# Patient Record
Sex: FEMALE | Race: WHITE | URBAN - METROPOLITAN AREA
[De-identification: names, ages, dates, MRNs, and addresses within clinical notes are randomized per-mention and may not be internally consistent; named-entity substitution may affect disease eponyms.]

---

## 2017-02-20 ENCOUNTER — TELEPHONE (OUTPATIENT)
Dept: FAMILY MEDICINE | Facility: OTHER | Age: 52
End: 2017-02-20

## 2017-02-20 ENCOUNTER — TRANSFERRED RECORDS (OUTPATIENT)
Dept: HEALTH INFORMATION MANAGEMENT | Facility: CLINIC | Age: 52
End: 2017-02-20

## 2017-02-20 NOTE — TELEPHONE ENCOUNTER
Reason for call:  Symptom  Reason for call:  Patient reporting a symptom    Symptom or request: Hives    Duration (how long have symptoms been present): on and off for over a month    Have you been treated for this before? No    Additional comments: Pt is calling because she has been having hives on and off and she has been taking over the counter medication to help with the itching. She isn't sure what is causing this. She is going thru menopause. Please advise. Pt is hoping to be worked in at the end of the day today. Pt would like to be seen at [place] or [place].     Phone Number patient can be reached at:  Home number on file [phone] (home)    Best Time:  anytime    Can we leave a detailed message on this number:  YES    Call taken on 2/20/2017 at 11:17 AM by Johanny Ibrahim

## 2017-02-20 NOTE — TELEPHONE ENCOUNTER
"Moira MCLEOD is a 51 year old female who calls with itching.    NURSING ASSESSMENT:  Description: Assessment initially starts with gathering information about her \"hives\". She describes that her skin itches and when she itches it, a red \"welt\" or \"line\" develops. This has been going on since the end of January. The location varies and will present itself on her arms, legs, neck, and back but she doesn't know much more than that. She answered \"I don't know\" to almost all of my questions. She does not know if she is jaundiced, if she has changed anything in her diet or soaps, etc. She thinks there is some mild swelling when she itches. She does admit to some difficulty breathing, but doesn't know when it occurs. She then admits to having difficulty breathing now and it feels like there is something sitting on her chest. Her father and grandfather had MI's but she doesn't know how old they are. The pressure started today.  Again, she answered \"I don't know\" to almost all further questions regarding the pressure and difficulty breathing. She did not sound like she had difficulty breathing, but did sound sluggish and tired.   Allergies:   Allergies   Allergen Reactions     Sulfa Drugs      Amoxicillin Rash     Augmentin Rash       RECOMMENDED DISPOSITION:  Call 911 - due to new onset of chest pressure and reported difficulty breathing.  Will comply with recommendation: Unknown - patient did not sound as if she was going to go. I spent numerous minutes advising recommendations.  If further questions/concerns or if Sx do not improve, worsen or new Sx develop, call your PCP or [facility] as soon as possible.    NOTES:  Disposition was determined by the first positive assessment question, therefore all previous assessment questions were negative.     Guideline used:  Telephone Triage Protocols for Nurses, Fourth Edition, Dominga Byers  Itching  Chest Pain    Breonna Holland, RN, BSN      "

## 2017-02-20 NOTE — PROGRESS NOTES
"  SUBJECTIVE:                                                    Moira Boykin is a 51 year old female who presents to clinic today for the following health issues:    ED/UC Followup:    Facility: [facility]  Date of visit: 2/20/17  Reason for visit: Hives since end of January  Current Status: Feeling fatigued after having IV steroids       She notes she has been getting itchy hives frequently, and attributes this to hormone changes. She describes the hives as \"welt-looking\" in appearance, that present on her arms, legs, feet and abdomen. The hives are mostly concentrated on right upper arm. She adds that her stress level is high. She relates that her hives are improving after IV steroids in the hospital on 2/20/17. Patient reports the first onset in August of the hives was around a time where she got , moved and started a new job. She is a . She relates she experiences increased anxiety with health issues. She sees a psychiatrist weekly. She is prescribed amitriptyline, and takes approximately every other day. Patient states that she has taken zoloft in the past, but did not like this medication. Normally, she sleeps 8-9 hours per night. Patient denies alcohol use. Patient reports things are going well with her .     Patient reports her arm is sore from where the IV was placed.    Patient relates lmp was in July. She inquires about HRT. She notes that she believes she experienced hot flashes last year.     Problem list and histories reviewed & adjusted, as indicated.  Additional history: as documented    Patient Active Problem List   Diagnosis     Other genital herpes     Anxiety state     CARDIOVASCULAR SCREENING; LDL GOAL LESS THAN 160     Concussion without loss of consciousness     Papanicolaou smear of cervix with low grade squamous intraepithelial lesion (LGSIL)     Chronic fatigue     BPPV (benign paroxysmal positional vertigo), right     Vertigo     Past Surgical " History   Procedure Laterality Date     C anesth, section       times 2       Social History   Substance Use Topics     Smoking status: Light Tobacco Smoker     Packs/day: 0.20     Types: Cigarettes     Smokeless tobacco: Never Used     Alcohol use No     Family History   Problem Relation Age of Onset     Hypertension Mother      GASTROINTESTINAL DISEASE Mother      liver and gallbladder swelling     Alcohol/Drug Mother      Depression Father      Cardiovascular Maternal Grandfather      congestive heart failure     HEART DISEASE Maternal Grandfather      congestive heart failure     Breast Cancer No family hx of      Anesthesia Reaction No family hx of      OSTEOPOROSIS No family hx of      Genetic Disorder No family hx of      Thyroid Disease No family hx of      Obesity No family hx of      Asthma No family hx of      Substance Abuse No family hx of      Colon Cancer No family hx of      Prostate Cancer No family hx of          Current Outpatient Prescriptions   Medication Sig Dispense Refill     DiphenhydrAMINE HCl (BENADRYL PO) Take 25 mg by mouth       acyclovir (ZOVIRAX) 5 % cream Apply topically 5 times daily 5 g 3     amitriptyline (ELAVIL) 25 MG tablet Take 1-2 tablets (25-50 mg) by mouth nightly as needed for sleep 60 tablet 5     meclizine (ANTIVERT) 25 MG tablet Take 25 mg by mouth Reported on 2017       ALPRAZolam (XANAX) 0.5 MG tablet Reported on 2017       fluticasone (FLONASE) 50 MCG/ACT nasal spray Reported on 2017  0     cefUROXime (CEFTIN) 500 MG tablet Take 1 tablet (500 mg) by mouth 2 times daily (Patient not taking: Reported on 2017) 20 tablet 0     meclizine (ANTIVERT) 25 MG tablet Take 0.5-1 tablets (12.5-25 mg) by mouth every 6 hours as needed for dizziness (Patient not taking: Reported on 2017) 20 tablet 0     cholecalciferol (VITAMIN D3) 50200 UNITS capsule Take 1 capsule (50,000 Units) by mouth once a week (Patient not taking: Reported on 2017) 8  "capsule 0     Problem list, Medication list, Allergies, and Medical/Social/Surgical histories reviewed in [facility] and updated as appropriate.    ROS:  Constitutional, neuro, ENT, endocrine, pulmonary, cardiac, gastrointestinal, genitourinary, musculoskeletal, integument and psychiatric systems are negative, except as otherwise noted.    This document serves as a record of the services and decisions personally performed and made by Tiff Rios DNP. It was created on her behalf by Aniyah Fajardo, a trained medical scribe. The creation of this document is based on the provider's statements to the medical scribe.  Aniyah Fajardo 2:19 PM February 23, 2017    OBJECTIVE:                                                    /80  Pulse 80  Temp 99.3  F (37.4  C) (Temporal)  Resp 10  Ht 5' 6.14\" (1.68 m)  Wt 146 lb (66.2 kg)  BMI 23.46 kg/m2  Body mass index is 23.46 kg/(m^2).  GENERAL APPEARANCE: healthy, alert and no distress  HENT: ear canals and TM's normal and nose and mouth without ulcers or lesions  NECK: no adenopathy, no asymmetry, masses, or scars and thyroid normal to palpation  RESP: lungs clear to auscultation - no rales, rhonchi or wheezes  CV: regular rates and rhythm, normal S1 S2, no S3 or S4 and no murmur, click or rub  SKIN:  No specific urticarial wheals today, has mild excoriating reaction with redness on right upper arm when scratched. No other suspicious lesions or rashes  NEURO: Normal strength and tone, mentation intact and speech normal  PSYCH: mentation appears normal and anxious affect     Diagnostic test results:  No results found for this or any previous visit (from the past 24 hour(s)).     ASSESSMENT/PLAN:                                                        ICD-10-CM    1. Urticaria L50.9 ALLERGY/ASTHMA ADULT REFERRAL   2. Symptomatic menopausal or female climacteric states N95.1    3. Anxiety state F41.1        Discussed hives. Suspect it is related to stress. Recommended patient " continue with counseling, ensure full nights sleep, exercise and healthy nutrition. Take benadryl in the morning and amitriptyline at night. If hives do not improve, referral placed to allergist.     Female climacteric symptoms are mild, not recommending HRT at this point.     HCM reviewed. Patient is due for pap smear and colonoscopy. Discussed doing FIT test instead of colonoscopy.      Follow up with Provider - Physical      The information in this document, created by the medical scribe for me, accurately reflects the services I personally performed and the decisions made by me. I have reviewed and approved this document for accuracy prior to leaving the patient care area.  February 23, 2017 2:19 PM    RAMON Riley [facility]

## 2017-02-23 ENCOUNTER — OFFICE VISIT (OUTPATIENT)
Dept: FAMILY MEDICINE | Facility: CLINIC | Age: 52
End: 2017-02-23
Payer: COMMERCIAL

## 2017-02-23 VITALS
TEMPERATURE: 99.3 F | BODY MASS INDEX: 23.46 KG/M2 | SYSTOLIC BLOOD PRESSURE: 108 MMHG | RESPIRATION RATE: 10 BRPM | DIASTOLIC BLOOD PRESSURE: 80 MMHG | HEIGHT: 66 IN | HEART RATE: 80 BPM | WEIGHT: 146 LBS

## 2017-02-23 DIAGNOSIS — N95.1 SYMPTOMATIC MENOPAUSAL OR FEMALE CLIMACTERIC STATES: ICD-10-CM

## 2017-02-23 DIAGNOSIS — F41.1 ANXIETY STATE: ICD-10-CM

## 2017-02-23 DIAGNOSIS — L50.9 URTICARIA: Primary | ICD-10-CM

## 2017-02-23 PROCEDURE — 99214 OFFICE O/P EST MOD 30 MIN: CPT | Performed by: NURSE PRACTITIONER

## 2017-02-23 ASSESSMENT — PAIN SCALES - GENERAL: PAINLEVEL: NO PAIN (0)

## 2017-02-23 NOTE — NURSING NOTE
"Chief Complaint   Patient presents with     ER F/U     Hives     Panel Management     MyChart, Tdap, Flu Shot, Pap, Colonoscopy/FIT, Tobacco Cessation, Lipids       Initial /80  Pulse 80  Temp 99.3  F (37.4  C) (Temporal)  Resp 10  Ht 5' 6.14\" (1.68 m)  Wt 146 lb (66.2 kg)  BMI 23.46 kg/m2 Estimated body mass index is 23.46 kg/(m^2) as calculated from the following:    Height as of this encounter: 5' 6.14\" (1.68 m).    Weight as of this encounter: 146 lb (66.2 kg).  Medication Reconciliation: complete     Jayna Dominguez CMA  "

## 2017-02-23 NOTE — MR AVS SNAPSHOT
After Visit Summary   2/23/2017    Moira Boykin    MRN: [id number]           Patient Information     Date Of Birth          1965        Visit Information        Provider Department      2/23/2017 2:00 PM Tiff Rios APRN CNP [facility]        Today's Diagnoses     Urticaria    -  1       Follow-ups after your visit        Additional Services     ALLERGY/ASTHMA ADULT REFERRAL       Your provider has referred you to: [facility]: [facility] [phone] [email or web address]    Please be aware that coverage of these services is subject to the terms and limitations of your health insurance plan.  Call member services at your health plan with any benefit or coverage questions.      Please bring the following with you to your appointment:    (1) Any X-Rays, CTs or MRIs which have been performed.  Contact the facility where they were done to arrange for  prior to your scheduled appointment.    (2) List of current medications  (3) This referral request   (4) Any documents/labs given to you for this referral                  Who to contact     If you have questions or need follow up information about today's clinic visit or your schedule please contact [facility] directly at [phone].  Normal or non-critical lab and imaging results will be communicated to you by MyChart, letter or phone within 4 business days after the clinic has received the results. If you do not hear from us within 7 days, please contact the clinic through MyChart or phone. If you have a critical or abnormal lab result, we will notify you by phone as soon as possible.  Submit refill requests through [employer] or call your pharmacy and they will forward the refill request to us. Please allow 3 business days for your refill to be completed.          Additional Information About Your Visit        [employer] Information     [employer] lets you send messages to  "your doctor, view your test results, renew your prescriptions, schedule appointments and more. To sign up, go to [email or web address] . Click on \"Log in\" on the left side of the screen, which will take you to the Welcome page. Then click on \"Sign up Now\" on the right side of the page.     You will be asked to enter the access code listed below, as well as some personal information. Please follow the directions to create your username and password.     Your access code is: [email or web address]  Expires: 2017  2:48 PM     Your access code will  in 90 days. If you need help or a new code, please call your [facility] clinic or [phone].        Care EveryWhere ID     This is your Care EveryWhere ID. This could be used by other organizations to access your [facility] medical records  [id number]        Your Vitals Were     Pulse Temperature Respirations Height BMI (Body Mass Index)       80 99.3  F (37.4  C) (Temporal) 10 5' 6.14\" (1.68 m) 23.46 kg/m2        Blood Pressure from Last 3 Encounters:   17 108/80   16 118/72   16 106/60    Weight from Last 3 Encounters:   17 146 lb (66.2 kg)   16 165 lb (74.8 kg)   16 143 lb (64.9 kg)              We Performed the Following     ALLERGY/ASTHMA ADULT REFERRAL        Primary Care Provider Office Phone # Fax #    Mely Corona [phone]       [facility]  [address] [address]        Thank you!     Thank you for choosing [facility]  for your care. Our goal is always to provide you with excellent care. Hearing back from our patients is one way we can continue to improve our services. Please take a few minutes to complete the written survey that you may receive in the mail after your visit with us. Thank you!             Your Updated Medication List - Protect others around you: Learn how to safely use, store and throw away your medicines at www.disposemymeds.org.          This " list is accurate as of: 2/23/17  2:51 PM.  Always use your most recent med list.                   Brand Name Dispense Instructions for use    acyclovir 5 % cream    ZOVIRAX    5 g    Apply topically 5 times daily       amitriptyline 25 MG tablet    ELAVIL    60 tablet    Take 1-2 tablets (25-50 mg) by mouth nightly as needed for sleep       BENADRYL PO      Take 25 mg by mouth       cefUROXime 500 MG tablet    CEFTIN    20 tablet    Take 1 tablet (500 mg) by mouth 2 times daily       cholecalciferol 99955 UNITS capsule    VITAMIN D3    8 capsule    Take 1 capsule (50,000 Units) by mouth once a week       fluticasone 50 MCG/ACT spray    FLONASE     Reported on 2/23/2017       * meclizine 25 MG tablet    ANTIVERT     Take 25 mg by mouth Reported on 2/23/2017       * meclizine 25 MG tablet    ANTIVERT    20 tablet    Take 0.5-1 tablets (12.5-25 mg) by mouth every 6 hours as needed for dizziness       XANAX 0.5 MG tablet   Generic drug:  ALPRAZolam      Reported on 2/23/2017       * Notice:  This list has 2 medication(s) that are the same as other medications prescribed for you. Read the directions carefully, and ask your doctor or other care provider to review them with you.

## 2017-03-16 ENCOUNTER — TELEPHONE (OUTPATIENT)
Dept: FAMILY MEDICINE | Facility: CLINIC | Age: 52
End: 2017-03-16

## 2017-03-16 NOTE — LETTER
[facility]  [address]  [place] [address]  [phone]      March 16, 2017      Moira Boykin  [address] [place] [id number]        Dear Moira,    We received a notice that you are to be scheduled with a specialty clinic. The referral has been placed by your provider and you can call to schedule an appointment directly.     Enclosed, you will find the referral with the phone number to call to schedule an appointment.    Please call us if you have any questions or concerns.        Sincerely,    Your [facility] Support Staff/ES

## 2017-03-16 NOTE — TELEPHONE ENCOUNTER
You placed a referral for patient to Allergy & Asthma on 2/23/17.  Patient has not scheduled as of yet.      Please review and forward to team if follow up with the patient is needed.     Thank you!  Antonia/Clinic Referrals Dyad II

## 2017-05-02 NOTE — PROGRESS NOTES
SUBJECTIVE:     CC: Moira Boykin is an 51 year old woman who presents for preventive health visit.     Healthy Habits:    Do you get at least three servings of calcium containing foods daily (dairy, green leafy vegetables, etc.)? yes    Amount of exercise or daily activities, outside of work: 3 day(s) per week. 3 miles    Problems taking medications regularly No    Medication side effects: No    Have you had an eye exam in the past two years? yes    Do you see a dentist twice per year? yes    Do you have sleep apnea, excessive snoring or daytime drowsiness?no      Hyperlipidemia Follow-Up      Rate your low fat/cholesterol diet?: not monitoring fat    Taking statin?  No    Other lipid medications/supplements?:  none       Patient takes 12.5 mg of amitriptyline and this has improved mood symptoms. She states she had a stressful weekend because she had to spend time with her family for her son's graduation. She notes she felt that she was not welcome and it was awkward. Patient states she had hives the next day after this. She notes that sees her therapist 1-2/week.     Patient had her period for 7 days in May and April. She states her flow was some what heavier than usual, but she was not saturating her tampons. She notes some minimal spotting on May 5th. She denies hot flashes.     She had a colposcopy after last abnormal pap smear.     Patient relates she has tingling in her feet in AM and PM. She does not take an iron supplement and does not eat green leafy vegetables.      Patient denies family history of colon cancer.     Today's PHQ-2 Score:   PHQ-2 ( 1999 Pf[employer]) 2/23/2017 8/4/2016   Q1: Little interest or pleasure in doing things 0 0   Q2: Feeling down, depressed or hopeless 0 0   PHQ-2 Score 0 0       Abuse: Current or Past(Physical, Sexual or Emotional)- No  Do you feel safe in your environment - Yes    Social History   Substance Use Topics     Smoking status: Light Tobacco Smoker     Packs/day:  0.20     Types: Cigarettes     Smokeless tobacco: Never Used      Comment: Just using E-Cig Now     Alcohol use No     The patient does not drink >3 drinks per day nor >7 drinks per week.    Recent Labs   Lab Test  11   1545  09   1255   CHOL  225*  203*   HDL  59  56   LDL  137*  130*   TRIG  144  86   CHOLHDLRATIO  4.0  4.0       Reviewed orders with patient.  Reviewed health maintenance and updated orders accordingly - Yes    Mammo Decision Support:  Patient over age 50, mutual decision to screen reflected in health maintenance.    Pertinent mammograms are reviewed under the imaging tab.  History of abnormal Pap smear:   Last 3 Pap Results:   PAP (no units)   Date Value   2016 LSIL (A)   2013 OTHER-NIL, See Result   2008 NIL       Reviewed and updated as needed this visit by clinical staff  Tobacco  Allergies  Med Hx  Surg Hx  Fam Hx  Soc Hx        Reviewed and updated as needed this visit by Provider        Past Medical History:   Diagnosis Date     Papanicolaou smear of cervix with low grade squamous intraepithelial lesion (LGSIL) 16    neg HR HPV      Past Surgical History:   Procedure Laterality Date     C ANESTH, SECTION      times 2       ROS:  C: NEGATIVE for fever, chills, change in weight  I: NEGATIVE for worrisome rashes, moles or lesions  E: NEGATIVE for vision changes or irritation  ENT: NEGATIVE for ear, mouth and throat problems  R: NEGATIVE for significant cough or SOB  B: NEGATIVE for masses, tenderness or discharge  CV: NEGATIVE for chest pain, palpitations or peripheral edema  GI: NEGATIVE for nausea, abdominal pain, heartburn, or change in bowel habits  : NEGATIVE for unusual urinary or vaginal symptoms. Periods are regular.  M: NEGATIVE for significant arthralgias or myalgia  N: NEGATIVE for weakness, dizziness or paresthesias  P: NEGATIVE for changes in mood or affect    Problem list, Medication list, Allergies, and Medical/Social/Surgical  histories reviewed in EPIC and updated as appropriate.  Patient Active Problem List   Diagnosis     Other genital herpes     Anxiety state     CARDIOVASCULAR SCREENING; LDL GOAL LESS THAN 160     Concussion without loss of consciousness     Papanicolaou smear of cervix with low grade squamous intraepithelial lesion (LGSIL)     Chronic fatigue     BPPV (benign paroxysmal positional vertigo), right     Vertigo     Past Surgical History:   Procedure Laterality Date     C ANESTH, SECTION      times 2       Social History   Substance Use Topics     Smoking status: Light Tobacco Smoker     Packs/day: 0.20     Types: Cigarettes     Smokeless tobacco: Never Used      Comment: Just using E-Cig Now     Alcohol use No     Family History   Problem Relation Age of Onset     Hypertension Mother      GASTROINTESTINAL DISEASE Mother      liver and gallbladder swelling     Alcohol/Drug Mother      Depression Father      Cardiovascular Maternal Grandfather      congestive heart failure     HEART DISEASE Maternal Grandfather      congestive heart failure     Breast Cancer No family hx of      Anesthesia Reaction No family hx of      OSTEOPOROSIS No family hx of      Genetic Disorder No family hx of      Thyroid Disease No family hx of      Obesity No family hx of      Asthma No family hx of      Substance Abuse No family hx of      Colon Cancer No family hx of      Prostate Cancer No family hx of          Current Outpatient Prescriptions   Medication Sig Dispense Refill     VITAMIN D, CHOLECALCIFEROL, PO Take 1,000 Units by mouth daily       amitriptyline (ELAVIL) 25 MG tablet Take 1 tablet (25 mg) by mouth nightly as needed for sleep 90 tablet 3     DiphenhydrAMINE HCl (BENADRYL PO) Take 25 mg by mouth       meclizine (ANTIVERT) 25 MG tablet Take 25 mg by mouth Reported on 2017       ALPRAZolam (XANAX) 0.5 MG tablet Reported on 2017       fluticasone (FLONASE) 50 MCG/ACT nasal spray Reported on 2017  0      "cefUROXime (CEFTIN) 500 MG tablet Take 1 tablet (500 mg) by mouth 2 times daily (Patient not taking: Reported on 5/8/2017) 20 tablet 0     meclizine (ANTIVERT) 25 MG tablet Take 0.5-1 tablets (12.5-25 mg) by mouth every 6 hours as needed for dizziness (Patient not taking: Reported on 2/23/2017) 20 tablet 0     cholecalciferol (VITAMIN D3) 31002 UNITS capsule Take 1 capsule (50,000 Units) by mouth once a week (Patient not taking: Reported on 2/23/2017) 8 capsule 0     acyclovir (ZOVIRAX) 5 % cream Apply topically 5 times daily (Patient not taking: Reported on 5/8/2017) 5 g 3     [DISCONTINUED] amitriptyline (ELAVIL) 25 MG tablet Take 1-2 tablets (25-50 mg) by mouth nightly as needed for sleep 60 tablet 5     OBJECTIVE:     /78  Pulse 68  Temp 99.6  F (37.6  C) (Temporal)  Resp 8  Ht 5' 5.16\" (1.655 m)  Wt 141 lb 14.4 oz (64.4 kg)  LMP 04/03/2017  BMI 23.5 kg/m2  EXAM:  GENERAL: healthy, alert and no distress  EYES: Eyes grossly normal to inspection, PERRL and conjunctivae and sclerae normal  HENT: ear canals and TM's normal, nose and mouth without ulcers or lesions  NECK: no adenopathy, no asymmetry, masses, or scars and thyroid normal to palpation  RESP: lungs clear to auscultation - no rales, rhonchi or wheezes  BREAST: normal without masses, tenderness or nipple discharge and no palpable axillary masses or adenopathy  CV: regular rate and rhythm, normal S1 S2, no S3 or S4, no murmur, click or rub, no peripheral edema and peripheral pulses strong  ABDOMEN: soft, nontender, no hepatosplenomegaly, no masses and bowel sounds normal   (female): normal female external genitalia, normal urethral meatus, vaginal mucosa pink, moist, well rugated, and normal cervix/adnexa/uterus without masses or discharge  MS: no gross musculoskeletal defects noted, no edema  SKIN: no suspicious lesions or rashes  NEURO: Normal strength and tone, mentation intact and speech normal  PSYCH: mentation appears normal, affect " normal/bright    ASSESSMENT/PLAN:         ICD-10-CM    1. Encounter for routine adult health examination without abnormal findings Z00.00 Lipid Profile (Chol, Trig, HDL, LDL calc)     GLUCOSE     TSH with free T4 reflex   2. Encounter for screening colonoscopy Z12.11 GASTROENTEROLOGY ADULT REF PROCEDURE ONLY   3. CARDIOVASCULAR SCREENING; LDL GOAL LESS THAN 160 Z13.6 Lipid Profile (Chol, Trig, HDL, LDL calc)   4. Encounter for screening mammogram for breast cancer Z12.31 *MA Screening Digital Bilateral   5. YONI (generalized anxiety disorder) F41.1 amitriptyline (ELAVIL) 25 MG tablet   6. Papanicolaou smear of cervix with low grade squamous intraepithelial lesion (LGSIL) R87.612 HPV High Risk Types DNA Cervical     Pap imaged thin layer diagnostic with HPV (select HPV order below)       Order placed for refill of amitryptiline. Medication direction, dosage, and side effects discussed with patient. Recommended patient take 25 mg     Pap smear with HPV collected today. Pt. declines needs for STD screen.     Recommended patient take an iron supplement to see if low iron is cause of tingling in LE, screening for DM.    Discussed colon cancer screenings - FIT screen vs colonoscopy. Order placed for colonoscopy.     Mammogram is due in November 2017.     Recommended patient take calcium with vitamin D supplement in AM and PM.     Order placed for labs that the patient will complete today.      COUNSELING:   Reviewed preventive health counseling, as reflected in patient instructions       Regular exercise       Healthy diet/nutrition       Colon cancer screening    BP Screening:   Last 3 BP Readings:    BP Readings from Last 3 Encounters:   05/08/17 130/78   02/23/17 108/80   12/08/16 118/72       The following was recommended to the patient:  Re-screen BP within a year and recommended lifestyle modifications     reports that she has been smoking Cigarettes.  She has been smoking about 0.20 packs per day. She has never used  "smokeless tobacco.  Tobacco Cessation Action Plan: Self help information given to patient- mailed.   Estimated body mass index is 23.5 kg/(m^2) as calculated from the following:    Height as of this encounter: 5' 5.16\" (1.655 m).    Weight as of this encounter: 141 lb 14.4 oz (64.4 kg).       Counseling Resources:  ATP IV Guidelines  Pooled Cohorts Equation Calculator  Breast Cancer Risk Calculator  FRAX Risk Assessment  ICSI Preventive Guidelines  Dietary Guidelines for Americans, 2010  USDA's MyPlate  ASA Prophylaxis  Lung CA Screening    The information in this document, created by the medical scribe for me, accurately reflects the services I personally performed and the decisions made by me. I have reviewed and approved this document for accuracy prior to leaving the patient care area.  May 8, 2017 11:42 AM    RAMON [facility]  "

## 2017-05-08 ENCOUNTER — TELEPHONE (OUTPATIENT)
Dept: FAMILY MEDICINE | Facility: CLINIC | Age: 52
End: 2017-05-08

## 2017-05-08 ENCOUNTER — OFFICE VISIT (OUTPATIENT)
Dept: FAMILY MEDICINE | Facility: CLINIC | Age: 52
End: 2017-05-08
Payer: COMMERCIAL

## 2017-05-08 VITALS
BODY MASS INDEX: 23.64 KG/M2 | TEMPERATURE: 99.6 F | SYSTOLIC BLOOD PRESSURE: 130 MMHG | RESPIRATION RATE: 8 BRPM | HEIGHT: 65 IN | DIASTOLIC BLOOD PRESSURE: 78 MMHG | WEIGHT: 141.9 LBS | HEART RATE: 68 BPM

## 2017-05-08 DIAGNOSIS — Z12.11 ENCOUNTER FOR SCREENING COLONOSCOPY: ICD-10-CM

## 2017-05-08 DIAGNOSIS — Z00.00 ENCOUNTER FOR ROUTINE ADULT HEALTH EXAMINATION WITHOUT ABNORMAL FINDINGS: Primary | ICD-10-CM

## 2017-05-08 DIAGNOSIS — Z13.6 CARDIOVASCULAR SCREENING; LDL GOAL LESS THAN 160: ICD-10-CM

## 2017-05-08 DIAGNOSIS — R87.612 PAPANICOLAOU SMEAR OF CERVIX WITH LOW GRADE SQUAMOUS INTRAEPITHELIAL LESION (LGSIL): ICD-10-CM

## 2017-05-08 DIAGNOSIS — F41.1 GAD (GENERALIZED ANXIETY DISORDER): ICD-10-CM

## 2017-05-08 DIAGNOSIS — Z12.31 ENCOUNTER FOR SCREENING MAMMOGRAM FOR BREAST CANCER: ICD-10-CM

## 2017-05-08 LAB
CHOLEST SERPL-MCNC: 204 MG/DL
GLUCOSE SERPL-MCNC: 86 MG/DL (ref 70–99)
HDLC SERPL-MCNC: 82 MG/DL
LDLC SERPL CALC-MCNC: 110 MG/DL
NONHDLC SERPL-MCNC: 122 MG/DL
TRIGL SERPL-MCNC: 61 MG/DL
TSH SERPL DL<=0.005 MIU/L-ACNC: 1.1 MU/L (ref 0.4–4)

## 2017-05-08 PROCEDURE — 84443 ASSAY THYROID STIM HORMONE: CPT | Performed by: NURSE PRACTITIONER

## 2017-05-08 PROCEDURE — 82947 ASSAY GLUCOSE BLOOD QUANT: CPT | Performed by: NURSE PRACTITIONER

## 2017-05-08 PROCEDURE — 99213 OFFICE O/P EST LOW 20 MIN: CPT | Mod: 25 | Performed by: NURSE PRACTITIONER

## 2017-05-08 PROCEDURE — 88141 CYTOPATH C/V INTERPRET: CPT | Performed by: NURSE PRACTITIONER

## 2017-05-08 PROCEDURE — 80061 LIPID PANEL: CPT | Performed by: NURSE PRACTITIONER

## 2017-05-08 PROCEDURE — 88175 CYTOPATH C/V AUTO FLUID REDO: CPT | Performed by: NURSE PRACTITIONER

## 2017-05-08 PROCEDURE — 99396 PREV VISIT EST AGE 40-64: CPT | Performed by: NURSE PRACTITIONER

## 2017-05-08 PROCEDURE — 87624 HPV HI-RISK TYP POOLED RSLT: CPT | Performed by: NURSE PRACTITIONER

## 2017-05-08 PROCEDURE — 36415 COLL VENOUS BLD VENIPUNCTURE: CPT | Performed by: NURSE PRACTITIONER

## 2017-05-08 ASSESSMENT — ANXIETY QUESTIONNAIRES
5. BEING SO RESTLESS THAT IT IS HARD TO SIT STILL: NOT AT ALL
3. WORRYING TOO MUCH ABOUT DIFFERENT THINGS: NOT AT ALL
IF YOU CHECKED OFF ANY PROBLEMS ON THIS QUESTIONNAIRE, HOW DIFFICULT HAVE THESE PROBLEMS MADE IT FOR YOU TO DO YOUR WORK, TAKE CARE OF THINGS AT HOME, OR GET ALONG WITH OTHER PEOPLE: SOMEWHAT DIFFICULT
GAD7 TOTAL SCORE: 3
7. FEELING AFRAID AS IF SOMETHING AWFUL MIGHT HAPPEN: NOT AT ALL
1. FEELING NERVOUS, ANXIOUS, OR ON EDGE: SEVERAL DAYS
2. NOT BEING ABLE TO STOP OR CONTROL WORRYING: SEVERAL DAYS
6. BECOMING EASILY ANNOYED OR IRRITABLE: SEVERAL DAYS

## 2017-05-08 ASSESSMENT — PATIENT HEALTH QUESTIONNAIRE - PHQ9: 5. POOR APPETITE OR OVEREATING: NOT AT ALL

## 2017-05-08 ASSESSMENT — PAIN SCALES - GENERAL: PAINLEVEL: NO PAIN (0)

## 2017-05-08 NOTE — NURSING NOTE
"Chief Complaint   Patient presents with     Physical     No questions or concerns. She will let Tiff know what she has been doing     Panel Management     Mychart, Tdap, Pap, Colon Cancer Screening, YONI. Lipid       Initial /78  Pulse 68  Temp 99.6  F (37.6  C) (Temporal)  Resp 8  Ht 5' 5.16\" (1.655 m)  Wt 141 lb 14.4 oz (64.4 kg)  LMP 04/03/2017  BMI 23.5 kg/m2 Estimated body mass index is 23.5 kg/(m^2) as calculated from the following:    Height as of this encounter: 5' 5.16\" (1.655 m).    Weight as of this encounter: 141 lb 14.4 oz (64.4 kg).  Medication Reconciliation: complete     Jayna Macias CMA  "

## 2017-05-08 NOTE — MR AVS SNAPSHOT
After Visit Summary   5/8/2017    Moira Boykin    MRN: [id number]           Patient Information     Date Of Birth          1965        Visit Information        Provider Department      5/8/2017 11:00 AM Tiff Rios APRN [facility] [place] [facility]        Today's Diagnoses     Encounter for routine adult health examination without abnormal findings    -  1    Encounter for screening colonoscopy        CARDIOVASCULAR SCREENING; LDL GOAL LESS THAN 160        Encounter for screening mammogram for breast cancer        YONI (generalized anxiety disorder)        Papanicolaou smear of cervix with low grade squamous intraepithelial lesion (LGSIL)          Care Instructions      Preventive Health Recommendations  Female Ages 50 - 64    Yearly exam: See your health care provider every year in order to  o Review health changes.   o Discuss preventive care.    o Review your medicines if your doctor has prescribed any.      Get a Pap test every three years (unless you have an abnormal result and your provider advises testing more often).    If you get Pap tests with HPV test, you only need to test every 5 years, unless you have an abnormal result.     You do not need a Pap test if your uterus was removed (hysterectomy) and you have not had cancer.    You should be tested each year for STDs (sexually transmitted diseases) if you're at risk.     Have a mammogram every 1 to 2 years.    Have a colonoscopy at age 50, or have a yearly FIT test (stool test). These exams screen for colon cancer.      Have a cholesterol test every 5 years, or more often if advised.    Have a diabetes test (fasting glucose) every three years. If you are at risk for diabetes, you should have this test more often.     If you are at risk for osteoporosis (brittle bone disease), think about having a bone density scan (DEXA).    Shots: Get a flu shot each year. Get a tetanus shot every 10 years.    Nutrition:     Eat at least 5  servings of fruits and vegetables each day.    Eat whole-grain bread, whole-wheat pasta and brown rice instead of white grains and rice.    Talk to your provider about Calcium and Vitamin D.     Lifestyle    Exercise at least 150 minutes a week (30 minutes a day, 5 days a week). This will help you control your weight and prevent disease.    Limit alcohol to one drink per day.    No smoking.     Wear sunscreen to prevent skin cancer.     See your dentist every six months for an exam and cleaning.    See your eye doctor every 1 to 2 years.          Follow-ups after your visit        Additional Services     GASTROENTEROLOGY ADULT REF PROCEDURE ONLY       Last Lab Result: Creatinine (mg/dL)       Date                     Value                 11/11/2011               0.80             ----------  Body mass index is 23.5 kg/(m^2).      Patient will be contacted to schedule procedure.     Please be aware that coverage of these services is subject to the terms and limitations of your health insurance plan.  Call member services at your health plan with any benefit or coverage questions.  Any procedures must be performed at a [place] facility OR coordinated by your clinic's referral office.    Please bring the following with you to your appointment:    (1) Any X-Rays, CTs or MRIs which have been performed.  Contact the facility where they were done to arrange for  prior to your scheduled appointment.    (2) List of current medications   (3) This referral request   (4) Any documents/labs given to you for this referral                  Future tests that were ordered for you today     Open Future Orders        Priority Expected Expires Ordered    *MA Screening Digital Bilateral Routine  5/8/2018 5/8/2017            Who to contact     If you have questions or need follow up information about today's clinic visit or your schedule please contact [facility] directly at [phone].  Normal or non-critical lab  "and imaging results will be communicated to you by MyChart, letter or phone within 4 business days after the clinic has received the results. If you do not hear from us within 7 days, please contact the clinic through [employer] or phone. If you have a critical or abnormal lab result, we will notify you by phone as soon as possible.  Submit refill requests through [employer] or call your pharmacy and they will forward the refill request to us. Please allow 3 business days for your refill to be completed.          Additional Information About Your Visit        FixstarsharNanoference Information     [employer] lets you send messages to your doctor, view your test results, renew your prescriptions, schedule appointments and more. To sign up, go to [email or web address] . Click on \"Log in\" on the left side of the screen, which will take you to the Welcome page. Then click on \"Sign up Now\" on the right side of the page.     You will be asked to enter the access code listed below, as well as some personal information. Please follow the directions to create your username and password.     Your access code is: [email or web address]  Expires: 2017  3:48 PM     Your access code will  in 90 days. If you need help or a new code, please call your [place] clinic or [phone].        Care EveryWhere ID     This is your Care EveryWhere ID. This could be used by other organizations to access your [place] medical records  [id number]        Your Vitals Were     Pulse Temperature Respirations Height Last Period BMI (Body Mass Index)    68 99.6  F (37.6  C) (Temporal) 8 5' 5.16\" (1.655 m) 2017 23.5 kg/m2       Blood Pressure from Last 3 Encounters:   17 130/78   17 108/80   16 118/72    Weight from Last 3 Encounters:   17 141 lb 14.4 oz (64.4 kg)   17 146 lb (66.2 kg)   16 165 lb (74.8 kg)              We Performed the Following     GASTROENTEROLOGY ADULT REF PROCEDURE ONLY     GLUCOSE     HPV High Risk " Types DNA Cervical     Lipid Profile (Chol, Trig, HDL, LDL calc)     OFFICE/OUTPT VISIT,EST,LEVL III     Pap imaged thin layer diagnostic with HPV (select HPV order below)     TSH with free T4 reflex          Today's Medication Changes          These changes are accurate as of: 5/8/17  3:32 PM.  If you have any questions, ask your nurse or doctor.               These medicines have changed or have updated prescriptions.        Dose/Directions    amitriptyline 25 MG tablet   Commonly known as:  ELAVIL   This may have changed:  how much to take   Used for:  YONI (generalized anxiety disorder)   Changed by:  Tiff Rios APRN CNP        Dose:  25 mg   Take 1 tablet (25 mg) by mouth nightly as needed for sleep   Quantity:  90 tablet   Refills:  3            Where to get your medicines      These medications were sent to [place] [id number] [place] [facility] - [address]  [address] [place] [id number]     Phone:  [phone]     amitriptyline 25 MG tablet                Primary Care Provider Office Phone # Fax #    Mely Corona [phone]       [address] [address]        Thank you!     Thank you for choosing [facility]  for your care. Our goal is always to provide you with excellent care. Hearing back from our patients is one way we can continue to improve our services. Please take a few minutes to complete the written survey that you may receive in the mail after your visit with us. Thank you!             Your Updated Medication List - Protect others around you: Learn how to safely use, store and throw away your medicines at www.disposemymeds.org.          This list is accurate as of: 5/8/17  3:32 PM.  Always use your most recent med list.                   Brand Name Dispense Instructions for use    acyclovir 5 % cream    ZOVIRAX    5 g    Apply topically 5 times daily       amitriptyline 25 MG tablet    ELAVIL    90 tablet     Take 1 tablet (25 mg) by mouth nightly as needed for sleep       BENADRYL PO      Take 25 mg by mouth       cefuroxime 500 MG tablet    CEFTIN    20 tablet    Take 1 tablet (500 mg) by mouth 2 times daily       * VITAMIN D (CHOLECALCIFEROL) PO      Take 1,000 Units by mouth daily       * cholecalciferol 98303 UNITS capsule    VITAMIN D3    8 capsule    Take 1 capsule (50,000 Units) by mouth once a week       fluticasone 50 MCG/ACT spray    FLONASE     Reported on 5/8/2017       * meclizine 25 MG tablet    ANTIVERT     Take 25 mg by mouth Reported on 5/8/2017       * meclizine 25 MG tablet    ANTIVERT    20 tablet    Take 0.5-1 tablets (12.5-25 mg) by mouth every 6 hours as needed for dizziness       XANAX 0.5 MG tablet   Generic drug:  ALPRAZolam      Reported on 5/8/2017       * Notice:  This list has 4 medication(s) that are the same as other medications prescribed for you. Read the directions carefully, and ask your doctor or other care provider to review them with you.

## 2017-05-08 NOTE — NURSING NOTE
Once in the room to administer pt Tdap vaccine, she declined the vaccine. Stated that she was feeling overwhelmed with her daily activities and lab work and decided this was not the best time for her to have the vaccine.    Jayna Macias CMA

## 2017-05-08 NOTE — TELEPHONE ENCOUNTER
Reason for Call:  Other- Medication     Detailed comments: Pt was seen today and told to take Vit D, Calcium and Centrum Silver. The Centrum Silver has 1000 IU of Vitamin D and 300 mg of Calcium. Should she be taking more Vit D and Calcium on top of that?     Phone Number Patient can be reached at: Home number on file [phone] (home)    Best Time: any     Can we leave a detailed message on this number? YES    Call taken on 5/8/2017 at 2:40 PM by Jody Jimenez

## 2017-05-08 NOTE — TELEPHONE ENCOUNTER
Yes, but then needs only one additional dose, not two doses of the calcium/vitamin D supplement. Tiff Rios

## 2017-05-09 ENCOUNTER — TELEPHONE (OUTPATIENT)
Dept: FAMILY MEDICINE | Facility: OTHER | Age: 52
End: 2017-05-09

## 2017-05-09 ASSESSMENT — ANXIETY QUESTIONNAIRES: GAD7 TOTAL SCORE: 3

## 2017-05-10 NOTE — TELEPHONE ENCOUNTER
Left message for patient to return call to schedule colonoscopy or EGD. If Molly or Sindi are unavailable, please transfer to the surgery center.     OK to schedule with KAYLAH or Harika

## 2017-05-11 LAB
COPATH REPORT: ABNORMAL
PAP: ABNORMAL

## 2017-05-11 NOTE — TELEPHONE ENCOUNTER
Left message for patient to return call to schedule colonoscopy or EGD. If Molly or Sindi are unavailable, please transfer to the surgery center.-Letter sent     OK to schedule with Ryan or Harika

## 2017-05-12 LAB
FINAL DIAGNOSIS: NORMAL
HPV HR 12 DNA CVX QL NAA+PROBE: NEGATIVE
HPV16 DNA SPEC QL NAA+PROBE: NEGATIVE
HPV18 DNA SPEC QL NAA+PROBE: NEGATIVE
SPECIMEN DESCRIPTION: NORMAL

## 2017-06-05 ENCOUNTER — OFFICE VISIT (OUTPATIENT)
Dept: OBGYN | Facility: OTHER | Age: 52
End: 2017-06-05
Payer: COMMERCIAL

## 2017-06-05 VITALS
HEART RATE: 86 BPM | DIASTOLIC BLOOD PRESSURE: 84 MMHG | WEIGHT: 145 LBS | BODY MASS INDEX: 24.01 KG/M2 | SYSTOLIC BLOOD PRESSURE: 126 MMHG

## 2017-06-05 DIAGNOSIS — R87.610 PAPANICOLAOU SMEAR OF CERVIX WITH ATYPICAL SQUAMOUS CELLS OF UNDETERMINED SIGNIFICANCE (ASC-US): Primary | ICD-10-CM

## 2017-06-05 PROCEDURE — 99243 OFF/OP CNSLTJ NEW/EST LOW 30: CPT | Mod: 25 | Performed by: OBSTETRICS & GYNECOLOGY

## 2017-06-05 PROCEDURE — 88305 TISSUE EXAM BY PATHOLOGIST: CPT | Performed by: OBSTETRICS & GYNECOLOGY

## 2017-06-05 PROCEDURE — 57456 ENDOCERV CURETTAGE W/SCOPE: CPT | Performed by: OBSTETRICS & GYNECOLOGY

## 2017-06-05 ASSESSMENT — PAIN SCALES - GENERAL: PAINLEVEL: NO PAIN (0)

## 2017-06-05 NOTE — NURSING NOTE
"No chief complaint on file.      Initial /84  Pulse 86  Wt 145 lb (65.8 kg)  LMP 04/03/2017  BMI 24.01 kg/m2 Estimated body mass index is 24.01 kg/(m^2) as calculated from the following:    Height as of 5/8/17: 5' 5.16\" (1.655 m).    Weight as of this encounter: 145 lb (65.8 kg).  Medication Reconciliation: complete  "

## 2017-06-05 NOTE — MR AVS SNAPSHOT
After Visit Summary   6/5/2017    Moira Boykin    MRN: [id number]           Patient Information     Date Of Birth          1965        Visit Information        Provider Department      6/5/2017 9:00 AM Paulette Amaya DO; [facility] COLP EQUIP, [facility]; NL PROC ROOM, [facility]        Today's Diagnoses     Papanicolaou smear of cervix with atypical squamous cells of undetermined significance (ASC-US)    -  1      Care Instructions    Colposcopy  Colposcopy is a procedure that gives your healthcare provider a magnified view of the cervix. It is done using a lighted microscope called a colposcope. In most cases, a sample of cervical cells is taken during a biopsy. The sample can then be studied in a lab. If any problems are found, you and your healthcare provider will discuss treatment options. It usually takes less than 30 minutes, and you can often go back to your normal routine right away.   Reasons for the Procedure  Colposcopy is usually done as a follow-up exam to help find the cause of an abnormal Pap test. Abnormal Pap tests are often due to an HPV (human papilloma virus) infection. HPV is a large family of viruses. HPV can cause genital warts. It can also cause changes in cervical cells. Colposcopy is also used to assess other problems. These include pain or bleeding during sexual intercourse, or a lesion on the vulva or vagina.   What Are the Risks?  Problems after colposcopy are very rare, but can include:    Bleeding (if a biopsy is done)    Infection  Getting Ready for the Procedure  Colposcopy is normally done in your healthcare provider s office. It will be scheduled for a time when you re not having your menstrual period. You may be asked to sign a form giving your consent to have the procedure. A day or two before the procedure, your healthcare provider may also ask you to:    Avoid sexual intercourse.    Stop using tampons.    Avoid using creams or other  vaginal medications.    Avoid douching.    Take over-the-counter pain medications an hour or two before the procedure.  During Colposcopy    You will be asked to lie on an exam table with your knees bent, just as you do for a Pap test.    An instrument called a speculum is inserted into the vagina to hold it open.    A vinegar solution is applied to the cervix to make the cells easier to see. You may feel pressure or a slight burning for a few moments. In some cases, the cervix may be numbed first with an anesthetic.    The cervix is viewed through the colposcope, which is placed outside the vagina.    If your healthcare provider sees abnormal areas on the cervix, a biopsy will be done. The tissue sample is sent to a lab for study.    You may feel slight pinching or cramping during the biopsy. Medication may be applied to the biopsy site to stop bleeding.  After the Procedure    If you feel lightheaded or dizzy, you can rest on the table until you re ready to get dressed.    If a biopsy was done, you may have mild cramping or light bleeding for a few days. You may also have discharge from the medication used to stop bleeding at the biopsy site.    Use pads, not tampons, for at least the first 24 hours.    If you have any discomfort, over-the-counter pain medication can provide relief.    Ask your healthcare provider when you can resume sexual intercourse.  Follow-Up  If a biopsy was done, your healthcare provider will get the lab report in a week or two. You and your healthcare provider can then discuss the results. In some cases, you may be scheduled for further tests or treatment. Be sure to keep follow-up appointments with your healthcare provider.  Call your healthcare provider if you have:    Heavy vaginal bleeding (more than a pad an hour for 2 hours).    Severe or increasing pelvic pain.    A fever over 101 F.    Foul-smelling or unusual vaginal discharge.               Follow-ups after your visit       "  Follow-up notes from your care team     Return in about 1 year (around 2018).      Who to contact     If you have questions or need follow up information about today's clinic visit or your schedule please contact [facility] directly at [phone].  Normal or non-critical lab and imaging results will be communicated to you by MyChart, letter or phone within 4 business days after the clinic has received the results. If you do not hear from us within 7 days, please contact the clinic through MyChart or phone. If you have a critical or abnormal lab result, we will notify you by phone as soon as possible.  Submit refill requests through [employer] or call your pharmacy and they will forward the refill request to us. Please allow 3 business days for your refill to be completed.          Additional Information About Your Visit        MyChart Information     [employer] lets you send messages to your doctor, view your test results, renew your prescriptions, schedule appointments and more. To sign up, go to [email or web address] . Click on \"Log in\" on the left side of the screen, which will take you to the Welcome page. Then click on \"Sign up Now\" on the right side of the page.     You will be asked to enter the access code listed below, as well as some personal information. Please follow the directions to create your username and password.     Your access code is: [email or web address]  Expires: 9/3/2017 10:41 AM     Your access code will  in 90 days. If you need help or a new code, please call your [facility] clinic or [phone].        Care EveryWhere ID     This is your Care EveryWhere ID. This could be used by other organizations to access your [facility] medical records  [id number]        Your Vitals Were     Pulse Last Period BMI (Body Mass Index)             86 2017 24.01 kg/m2          Blood Pressure from Last 3 Encounters:   17 126/84   17 130/78   17 108/80    Weight from " Last 3 Encounters:   06/05/17 145 lb (65.8 kg)   05/08/17 141 lb 14.4 oz (64.4 kg)   02/23/17 146 lb (66.2 kg)              We Performed the Following     COLP CERVIX/UPPER VAGINA W BX CERVIX/ENDOCERV CURETT     Surgical pathology exam        Primary Care Provider Office Phone # Fax #    Mely Karen Corona [phone]       [facility]  [address] [facility] [address]        Thank you!     Thank you for choosing [facility]  for your care. Our goal is always to provide you with excellent care. Hearing back from our patients is one way we can continue to improve our services. Please take a few minutes to complete the written survey that you may receive in the mail after your visit with us. Thank you!             Your Updated Medication List - Protect others around you: Learn how to safely use, store and throw away your medicines at www.disposemymeds.org.          This list is accurate as of: 6/5/17 10:41 AM.  Always use your most recent med list.                   Brand Name Dispense Instructions for use    acyclovir 5 % cream    ZOVIRAX    5 g    Apply topically 5 times daily       amitriptyline 25 MG tablet    ELAVIL    90 tablet    Take 1 tablet (25 mg) by mouth nightly as needed for sleep       BENADRYL PO      Take 25 mg by mouth       cholecalciferol 65321 UNITS capsule    VITAMIN D3    8 capsule    Take 1 capsule (50,000 Units) by mouth once a week       fluticasone 50 MCG/ACT spray    FLONASE     Reported on 5/8/2017       meclizine 25 MG tablet    ANTIVERT    20 tablet    Take 0.5-1 tablets (12.5-25 mg) by mouth every 6 hours as needed for dizziness       XANAX 0.5 MG tablet   Generic drug:  ALPRAZolam      Reported on 5/8/2017

## 2017-06-05 NOTE — PROGRESS NOTES
I have been asked to see Moira in consultation by Tiff Rios  to discuss the pap smear, findings and possible further evaluation.  The patient's pap smear on 17 showed ASCUS.   I attempted to ensure that the patient was educated regarding the nature of her findings and implications to date.  We reviewed the role of HPV, incidence in the population and the natural history of the infection, and its transmission.  We also reviewed ways to minimize her future risk, the effect of HPV on the cervix and treatment options available, should they be indicated.    The pathophysiology of the cervix, including a discussion of the squamous and columnar cells, metaplasia and dysplasia have been reviewed, drawings, sketches and the pamphlets were reviewed with her.      Patient's last menstrual period was 2017.  Current Birth Control Method: abstinence  History of veneral diseases: : No  History of genital warts:  No  Visible warts now?:  No  Family History of  Cervical, Uterine or Vaginal Cancer?: No    Past Medical History:   Diagnosis Date     ASCUS of cervix with negative high risk HPV 2017     Papanicolaou smear of cervix with low grade squamous intraepithelial lesion (LGSIL) 16    neg HR HPV     Tobacco abuse        Past Surgical History:   Procedure Laterality Date     C ANESTH, SECTION      times 2        Outpatient Encounter Prescriptions as of 2017   Medication Sig Dispense Refill     amitriptyline (ELAVIL) 25 MG tablet Take 1 tablet (25 mg) by mouth nightly as needed for sleep 90 tablet 3     [DISCONTINUED] VITAMIN D, CHOLECALCIFEROL, PO Take 1,000 Units by mouth daily       DiphenhydrAMINE HCl (BENADRYL PO) Take 25 mg by mouth       ALPRAZolam (XANAX) 0.5 MG tablet Reported on 2017       fluticasone (FLONASE) 50 MCG/ACT nasal spray Reported on 2017  0     meclizine (ANTIVERT) 25 MG tablet Take 0.5-1 tablets (12.5-25 mg) by mouth every 6 hours as needed for dizziness (Patient not  taking: Reported on 2/23/2017) 20 tablet 0     [DISCONTINUED] meclizine (ANTIVERT) 25 MG tablet Take 25 mg by mouth Reported on 5/8/2017       [DISCONTINUED] cefUROXime (CEFTIN) 500 MG tablet Take 1 tablet (500 mg) by mouth 2 times daily (Patient not taking: Reported on 5/8/2017) 20 tablet 0     cholecalciferol (VITAMIN D3) 44113 UNITS capsule Take 1 capsule (50,000 Units) by mouth once a week (Patient not taking: Reported on 2/23/2017) 8 capsule 0     acyclovir (ZOVIRAX) 5 % cream Apply topically 5 times daily (Patient not taking: Reported on 5/8/2017) 5 g 3     No facility-administered encounter medications on file as of 6/5/2017.         Allergies as of 06/05/2017 - [place] as Reviewed 06/05/2017   Allergen Reaction Noted     Sulfa drugs  05/24/2001     Amoxicillin Rash 11/28/2014     Augmentin Rash 08/04/2016       Social History     Social History     Marital status:      Spouse name: N/A     Number of children: N/A     Years of education: N/A     Social History Main Topics     Smoking status: Light Tobacco Smoker     Packs/day: 0.20     Types: Cigarettes     Smokeless tobacco: Never Used      Comment: Just using [employer]     Alcohol use No     Drug use: No     Sexual activity: Yes     Partners: Male     Birth control/ protection: Surgical      Comment: tubal ligation     Other Topics Concern     Parent/Sibling W/ Cabg, Mi Or Angioplasty Before 65f 55m? Yes     father late 40s     Social History Narrative        Family History   Problem Relation Age of Onset     Hypertension Mother      GASTROINTESTINAL DISEASE Mother      liver and gallbladder swelling     Alcohol/Drug Mother      Depression Father      Cardiovascular Maternal Grandfather      congestive heart failure     HEART DISEASE Maternal Grandfather      congestive heart failure     Breast Cancer No family hx of      Anesthesia Reaction No family hx of      OSTEOPOROSIS No family hx of      Genetic Disorder No family hx of      Thyroid Disease No  family hx of      Obesity No family hx of      Asthma No family hx of      Substance Abuse No family hx of      Colon Cancer No family hx of      Prostate Cancer No family hx of          Review Of Systems  Skin: negative  Eyes: negative  Ears/Nose/Throat: negative  Respiratory: negative  Cardiovascular: negative  Gastrointestinal: negative  Genitourinary: negative  Musculoskeletal: negative  Neurologic: negative  Psychiatric: negative  Hematologic/Lymphatic/Immunologic: negative  Endocrine: negative     Exam:   /84  Pulse 86  Wt 145 lb (65.8 kg)  LMP 04/03/2017  BMI 24.01 kg/m2  GENERAL:  WNWD female NAD  HEENT: NC/AT, EOMI  SKIN: normal skin turgor  GAIT: Normal  NECK: Symmetrical, no masses noted   VULVA: Normal Genitalia  BUS: Normal  URETHRA:  No hypermobility noted  URETHRAL MEATUS:  No masses noted  VAGINA: Normal mucosa, no discharge  CERVIX: Closed, mobile, no discharge  PERIANAL:  No masses or lesions seen  EXTREMITIES: no clubbing, cyanosis, or edema    Assessment:  ASCUS    Plan:  Recommend to Proceed with Colpo  The details of the colposcopic procedure were reviewed, the risks of missed diagnoses, pain, infection, and bleeding.    TT 20 min, in addition to the time for the procedure  CT greater than 50%, as noted above in the HPI and in the Plan.         Procedure:  Procedure for colposcopy and biopsy has been explained to the patient and consent obtained.    Before the procedure, it was ensured that the patient was educated regarding the nature of her findings and implications to date.  We reviewed the role of HPV and the natural history of the infection.  We also reviewed ways to minimize her future risk, the effect of HPV on the cervix and treatment options available, should they be indicated.    The pathophysiology of the cervix, including a discussion of the squamous and columnar cells, metaplasia and dysplasia have been reviewed, drawings, sketches and the pamphlets were reviewed with  her.  The details of the colposcopic procedure were reviewed, the risks of missed diagnoses, pain, infection, and bleeding.  Questions seemed to be answered before proceeding and the patient then consented to the procedure.     Procedure:    Speculum placed and cervix visualized. Vagina normal with no lesions noted. Acetic acid applied to the cervix. The colposcopy is satisfactory as the entire transformation zone is visualized. No acetowhite epithelial changes noted.  Lugal's solution applied to patients cervix.  ECC obtained.  Specimen placed aside to be sent to pathology.  Speculum removed    She tolerated the procedure well. There were no apparent complications.    She is instructed not to use tampons or have intercourse for 5 days.  Instructed to call if she has persistent bleeding, foul vaginal discharge or any other concerns.    Findings:    No images are attached to the encounter.     Cervix: no visible lesions  Vaginal inspection: vaginal colposcopy not performed.  Vulvar colposcopy: vulvar colposcopy not performed. N/A  Procedure Summary: Patient tolerated procedure well.      Assessment:   ASCUS    Plan:  Specimens labelled and sent to pathology.  Will base further treatment on pathology findings.  Post biopsy instructions given to patient and call to discuss Pathology results.    Patient also mentioned irregular menses.  We reviewed her last endometrial biopsy and ultrasound as well as recent labs.  Patient had many questions regarding metrorrhagia.  I recommended an endometrial biopsy and offered to do it today but patient refuses and states she will think about it.      Paulette Amaya

## 2017-06-05 NOTE — PATIENT INSTRUCTIONS
Colposcopy  Colposcopy is a procedure that gives your healthcare provider a magnified view of the cervix. It is done using a lighted microscope called a colposcope. In most cases, a sample of cervical cells is taken during a biopsy. The sample can then be studied in a lab. If any problems are found, you and your healthcare provider will discuss treatment options. It usually takes less than 30 minutes, and you can often go back to your normal routine right away.   Reasons for the Procedure  Colposcopy is usually done as a follow-up exam to help find the cause of an abnormal Pap test. Abnormal Pap tests are often due to an HPV (human papilloma virus) infection. HPV is a large family of viruses. HPV can cause genital warts. It can also cause changes in cervical cells. Colposcopy is also used to assess other problems. These include pain or bleeding during sexual intercourse, or a lesion on the vulva or vagina.   What Are the Risks?  Problems after colposcopy are very rare, but can include:    Bleeding (if a biopsy is done)    Infection  Getting Ready for the Procedure  Colposcopy is normally done in your healthcare provider s office. It will be scheduled for a time when you re not having your menstrual period. You may be asked to sign a form giving your consent to have the procedure. A day or two before the procedure, your healthcare provider may also ask you to:    Avoid sexual intercourse.    Stop using tampons.    Avoid using creams or other vaginal medications.    Avoid douching.    Take over-the-counter pain medications an hour or two before the procedure.  During Colposcopy    You will be asked to lie on an exam table with your knees bent, just as you do for a Pap test.    An instrument called a speculum is inserted into the vagina to hold it open.    A vinegar solution is applied to the cervix to make the cells easier to see. You may feel pressure or a slight burning for a few moments. In some cases, the cervix  may be numbed first with an anesthetic.    The cervix is viewed through the colposcope, which is placed outside the vagina.    If your healthcare provider sees abnormal areas on the cervix, a biopsy will be done. The tissue sample is sent to a lab for study.    You may feel slight pinching or cramping during the biopsy. Medication may be applied to the biopsy site to stop bleeding.  After the Procedure    If you feel lightheaded or dizzy, you can rest on the table until you re ready to get dressed.    If a biopsy was done, you may have mild cramping or light bleeding for a few days. You may also have discharge from the medication used to stop bleeding at the biopsy site.    Use pads, not tampons, for at least the first 24 hours.    If you have any discomfort, over-the-counter pain medication can provide relief.    Ask your healthcare provider when you can resume sexual intercourse.  Follow-Up  If a biopsy was done, your healthcare provider will get the lab report in a week or two. You and your healthcare provider can then discuss the results. In some cases, you may be scheduled for further tests or treatment. Be sure to keep follow-up appointments with your healthcare provider.  Call your healthcare provider if you have:    Heavy vaginal bleeding (more than a pad an hour for 2 hours).    Severe or increasing pelvic pain.    A fever over 101 F.    Foul-smelling or unusual vaginal discharge.

## 2017-06-07 LAB — COPATH REPORT: NORMAL

## 2017-06-23 ENCOUNTER — OFFICE VISIT (OUTPATIENT)
Dept: OBGYN | Facility: CLINIC | Age: 52
End: 2017-06-23
Payer: COMMERCIAL

## 2017-06-23 VITALS
SYSTOLIC BLOOD PRESSURE: 128 MMHG | HEART RATE: 70 BPM | WEIGHT: 140 LBS | BODY MASS INDEX: 23.19 KG/M2 | DIASTOLIC BLOOD PRESSURE: 88 MMHG

## 2017-06-23 DIAGNOSIS — N92.1 MENORRHAGIA WITH IRREGULAR CYCLE: ICD-10-CM

## 2017-06-23 DIAGNOSIS — N95.0 POSTMENOPAUSAL BLEEDING: Primary | ICD-10-CM

## 2017-06-23 PROCEDURE — 58100 BIOPSY OF UTERUS LINING: CPT | Performed by: OBSTETRICS & GYNECOLOGY

## 2017-06-23 PROCEDURE — 88305 TISSUE EXAM BY PATHOLOGIST: CPT | Performed by: OBSTETRICS & GYNECOLOGY

## 2017-06-23 PROCEDURE — 99214 OFFICE O/P EST MOD 30 MIN: CPT | Mod: 25 | Performed by: OBSTETRICS & GYNECOLOGY

## 2017-06-23 ASSESSMENT — PAIN SCALES - GENERAL: PAINLEVEL: NO PAIN (0)

## 2017-06-23 NOTE — NURSING NOTE
"Chief Complaint   Patient presents with     Procedure     Endometrial biopsy -  consent and No UPT       Initial /88  Pulse 70  Wt 63.5 kg (140 lb)  BMI 23.19 kg/m2 Estimated body mass index is 23.19 kg/(m^2) as calculated from the following:    Height as of 5/8/17: 1.655 m (5' 5.16\").    Weight as of this encounter: 63.5 kg (140 lb).  Medication Reconciliation: complete         "

## 2017-06-23 NOTE — PROGRESS NOTES
Subjective  51 year old non-pregnant female presents today for an endometrial biopsy for post menopausal bleeding.  Patient had stated at her colposcopy on  that she had had some irregular vaginal bleeding.  She had gone almost a full year without a menses then started to bleed again in May.  In May she had a heavy menses and since then has had irregular vaginal spotting.  We discussed doing an endometrial biopsy.        ROS: 10 point ROS neg other than the symptoms noted above in the HPI.  Past Medical History:   Diagnosis Date     ASCUS of cervix with negative high risk HPV 2017     Papanicolaou smear of cervix with low grade squamous intraepithelial lesion (LGSIL) 16    neg HR HPV     Tobacco abuse      Past Surgical History:   Procedure Laterality Date     C ANESTH, SECTION      times 2     Family History   Problem Relation Age of Onset     Hypertension Mother      GASTROINTESTINAL DISEASE Mother      liver and gallbladder swelling     Alcohol/Drug Mother      Depression Father      Cardiovascular Maternal Grandfather      congestive heart failure     HEART DISEASE Maternal Grandfather      congestive heart failure     Breast Cancer No family hx of      Anesthesia Reaction No family hx of      OSTEOPOROSIS No family hx of      Genetic Disorder No family hx of      Thyroid Disease No family hx of      Obesity No family hx of      Asthma No family hx of      Substance Abuse No family hx of      Colon Cancer No family hx of      Prostate Cancer No family hx of      Social History   Substance Use Topics     Smoking status: Light Tobacco Smoker     Packs/day: 0.20     Types: Cigarettes     Smokeless tobacco: Never Used      Comment: Just using [employer]     Alcohol use No         Objective  Vitals: /88  Pulse 70  Wt 63.5 kg (140 lb)  BMI 23.19 kg/m2  BMI= Body mass index is 23.19 kg/(m^2).    General appearance=mood is stable, no deformities noted, anxious  PELVIC:    External genitalia:  normal without lesions or masses  Urethral meatus: no lesions or prolapse noted, normal size  Urethra: no masses, non tender  Bladder: non tender, no fullness  Vagina: normal mucosa and rugae, no discharge.  Cervix: normal without lesion, no cervical motion tenderness, healthy  Uterus: small, mobile, nontender.  Adnexa: non tender, without masses  Rectal: deffered      Procedure:  Endometrial biopsy    Indication: Post-menopausal bleeding    Discussed risk of bleeding, infection, uterine perforation, cramping pain.  Pt agreed to proceed with procedure after all questions answered.    Speculum placed and cervix visualized.  Cervix cleansed with betadine x 3.  Tenaculum placed on anterior lip of the cervix.  Cervical dilator used. Endometrial biopsy pipelle passed through cervix and uterus sounded to 7.5 cm.  Biopsy specimen collected with one pass with return of moderate amount of pink tissue.  Specimen placed in a labeled container and set aside to be sent to pathology.  Tenaculum removed from the cervix and sites hemostatic with Silver Nitrate.  No bleeding noted from cervical os.     Patient tolerated the procedure well.  There were no apparent complications and bleeding was minimal.    She is instructed to use no tampons and have no intercourse for the next 5 days.      Assessment  1.)  Post menopausal bleeding       Plan  1.)  Endometrial biopsy      20 minutes was spent face to face with the patient today discussing her history, diagnosis, and follow-up plan as noted above.  Over 50% of the visit was spent in counseling and coordination of care.    Total Visit Time: 25 minutes including counseling and physical exam not including time spent on the procedure.    Nursing notes read and reviewed    Paulette Amaya

## 2017-06-23 NOTE — PATIENT INSTRUCTIONS
Endometrial Biopsy  Endometrial biopsy is a procedure used to study the endometrium (lining of the uterus). It is usually done in your healthcare provider s office. During the biopsy, small tissue samples are taken from the uterine lining. These are then sent to a lab for study. If any problems are found, you and your healthcare provider will discuss treatment options. The biopsy usually takes less than 20 minutes, and you can often go back to your normal routine as soon as the procedure is over.   Reasons for the Procedure  Endometrial biopsy may help pinpoint the cause of certain problems. These include:    Bleeding after menopause    Heavy or irregular periods    Bleeding associated with hormone replacement therapy    Prolonged bleeding    Abnormal Pap test results    Trouble getting pregnant (fertility problems)    What Are the Risks?  Problems with endometrial biopsy are rare, but can include:    Bleeding    Infection    Damage to the uterine wall (very rare)  Getting Ready for the Procedure  Your doctor will ask about your health and any medications you take, such as blood thinners. Before your biopsy, you may have tests to make sure you re not pregnant or have an infection. You may also be asked to sign a consent form. A day or two before the procedure:     Avoid using creams or other vaginal medications.    Avoid douching.    Ask your healthcare provider if you should take pain medications shortly before the test.   During the Biopsy    You will be asked to lie on an exam table with your knees bent, just as you do for a Pap test.    You may have a brief pelvic exam. An instrument called a speculum is then inserted into the vagina to hold it open.    An antiseptic solution is applied to the cervix. The cervix may also be numbed with an anesthetic or dilated to widen the opening.    A small suction tube is passed through the cervix into the uterus.    It is normal to feel some cramping when the tube is  inserted. But tell your healthcare provider if you have severe cramping or are very uncomfortable.    Using mild suction, samples are taken from the uterine lining. You may feel pinching or additional cramping when this is done.    The tube and speculum are then removed and the samples sent to a lab for study.  After the Procedure    If you feel lightheaded or dizzy, you can rest on the table until you re ready to get dressed.    For a few hours, you may feel some mild cramping. This can usually be relieved with over-the-counter pain medications.    You may have some bleeding for a few days. Use pads instead of tampons.    Don t douche or use any vaginal medications unless your healthcare provider says it s okay.    Ask your healthcare provider when it s okay to have sex again.  Follow-Up  It will take about a week for the biopsy results to come back from the lab. Then you and your healthcare provider can discuss the results. These may show that no treatment is required. Or, you may be scheduled for a follow-up appointment and further tests. If your biopsy was done for fertility problems, be sure to record the day when your next period begins.     Call your healthcare provider if you have:    Heavy bleeding (more than a pad an hour for 2 hours).    Severe cramping, or increasing pain.    Fever over 101 F.    Foul-smelling or unusual vaginal discharge.     What you may expect after an endometrial biopsy:  Mild cramping for less than 48 hours is to be expected, if you have can take ibuprofen or Motrin you may use this for the cramping.   A small amount of bleeding would be considered normal as well.    You may resume your normal activities including sexual intercourse as soon as you feel ready.       WARNING SIGNS:  If you are experiencing:  Fever  Foul smelling vaginal discharge  Cramping lasting longer than 48 hours  Severe cramping  Bleeding heavier than a period  CALL THE CLINIC IMMEDIATELY    You will be  contacted with the results of your biopsy in about one week.   A follow up plan will be made with you when your results are available.     Paulette Amaya

## 2017-06-23 NOTE — MR AVS SNAPSHOT
After Visit Summary   6/23/2017    Moira Boykin    MRN: [id number]           Patient Information     Date Of Birth          1965        Visit Information        Provider Department      6/23/2017 10:30 AM Paulette Amaya DO [facility]        Today's Diagnoses     Postmenopausal bleeding    -  1      Care Instructions      Endometrial Biopsy  Endometrial biopsy is a procedure used to study the endometrium (lining of the uterus). It is usually done in your healthcare provider s office. During the biopsy, small tissue samples are taken from the uterine lining. These are then sent to a lab for study. If any problems are found, you and your healthcare provider will discuss treatment options. The biopsy usually takes less than 20 minutes, and you can often go back to your normal routine as soon as the procedure is over.   Reasons for the Procedure  Endometrial biopsy may help pinpoint the cause of certain problems. These include:    Bleeding after menopause    Heavy or irregular periods    Bleeding associated with hormone replacement therapy    Prolonged bleeding    Abnormal Pap test results    Trouble getting pregnant (fertility problems)    What Are the Risks?  Problems with endometrial biopsy are rare, but can include:    Bleeding    Infection    Damage to the uterine wall (very rare)  Getting Ready for the Procedure  Your doctor will ask about your health and any medications you take, such as blood thinners. Before your biopsy, you may have tests to make sure you re not pregnant or have an infection. You may also be asked to sign a consent form. A day or two before the procedure:     Avoid using creams or other vaginal medications.    Avoid douching.    Ask your healthcare provider if you should take pain medications shortly before the test.   During the Biopsy    You will be asked to lie on an exam table with your knees bent, just as you do for a Pap test.    You may have  a brief pelvic exam. An instrument called a speculum is then inserted into the vagina to hold it open.    An antiseptic solution is applied to the cervix. The cervix may also be numbed with an anesthetic or dilated to widen the opening.    A small suction tube is passed through the cervix into the uterus.    It is normal to feel some cramping when the tube is inserted. But tell your healthcare provider if you have severe cramping or are very uncomfortable.    Using mild suction, samples are taken from the uterine lining. You may feel pinching or additional cramping when this is done.    The tube and speculum are then removed and the samples sent to a lab for study.  After the Procedure    If you feel lightheaded or dizzy, you can rest on the table until you re ready to get dressed.    For a few hours, you may feel some mild cramping. This can usually be relieved with over-the-counter pain medications.    You may have some bleeding for a few days. Use pads instead of tampons.    Don t douche or use any vaginal medications unless your healthcare provider says it s okay.    Ask your healthcare provider when it s okay to have sex again.  Follow-Up  It will take about a week for the biopsy results to come back from the lab. Then you and your healthcare provider can discuss the results. These may show that no treatment is required. Or, you may be scheduled for a follow-up appointment and further tests. If your biopsy was done for fertility problems, be sure to record the day when your next period begins.     Call your healthcare provider if you have:    Heavy bleeding (more than a pad an hour for 2 hours).    Severe cramping, or increasing pain.    Fever over 101 F.    Foul-smelling or unusual vaginal discharge.     What you may expect after an endometrial biopsy:  Mild cramping for less than 48 hours is to be expected, if you have can take ibuprofen or Motrin you may use this for the cramping.   A small amount of  "bleeding would be considered normal as well.    You may resume your normal activities including sexual intercourse as soon as you feel ready.       WARNING SIGNS:  If you are experiencing:  Fever  Foul smelling vaginal discharge  Cramping lasting longer than 48 hours  Severe cramping  Bleeding heavier than a period  CALL THE CLINIC IMMEDIATELY    You will be contacted with the results of your biopsy in about one week.   A follow up plan will be made with you when your results are available.     Paulette Amaya              Follow-ups after your visit        Follow-up notes from your care team     Return if symptoms worsen or fail to improve.      Who to contact     If you have questions or need follow up information about today's clinic visit or your schedule please contact [facility] directly at [phone].  Normal or non-critical lab and imaging results will be communicated to you by MyChart, letter or phone within 4 business days after the clinic has received the results. If you do not hear from us within 7 days, please contact the clinic through MyChart or phone. If you have a critical or abnormal lab result, we will notify you by phone as soon as possible.  Submit refill requests through [employer] or call your pharmacy and they will forward the refill request to us. Please allow 3 business days for your refill to be completed.          Additional Information About Your Visit        [employer] Information     [employer] lets you send messages to your doctor, view your test results, renew your prescriptions, schedule appointments and more. To sign up, go to [email or web address] . Click on \"Log in\" on the left side of the screen, which will take you to the Welcome page. Then click on \"Sign up Now\" on the right side of the page.     You will be asked to enter the access code listed below, as well as some personal information. Please follow the directions to create your username and password.   "   Your access code is: [email or web address]  Expires: 9/3/2017 10:41 AM     Your access code will  in 90 days. If you need help or a new code, please call your [facility] clinic or [phone].        Care EveryWhere ID     This is your Care EveryWhere ID. This could be used by other organizations to access your [facility] medical records  [id number]        Your Vitals Were     Pulse BMI (Body Mass Index)                70 23.19 kg/m2           Blood Pressure from Last 3 Encounters:   17 128/88   17 126/84   17 130/78    Weight from Last 3 Encounters:   17 63.5 kg (140 lb)   17 65.8 kg (145 lb)   17 64.4 kg (141 lb 14.4 oz)              We Performed the Following     ENDOMETRIAL BIOPSY W/O CERVICAL DILATION     Surgical pathology exam        Primary Care Provider Office Phone # Fax #    Mely Karen Corona [phone]       [facility]  [address] [address]        Equal Access to Services     [facility] : Hadii aad ku hadasho Soomaali, waaxda luqadaha, qaybta kaalmada adebradly, eris flanagan . So [facility] [phone].    ATENCIÓN: Si habla español, tiene a parham disposición servicios gratuitos de asistencia lingüística. Lj al [phone].    We comply with applicable federal civil rights laws and [place] laws. We do not discriminate on the basis of race, color, national origin, age, disability sex, sexual orientation or gender identity.            Thank you!     Thank you for choosing [facility]  for your care. Our goal is always to provide you with excellent care. Hearing back from our patients is one way we can continue to improve our services. Please take a few minutes to complete the written survey that you may receive in the mail after your visit with us. Thank you!             Your Updated Medication List - Protect others around you: Learn how to safely use, store and throw away your medicines  at www.disposemymeds.org.          This list is accurate as of: 6/23/17 11:14 AM.  Always use your most recent med list.                   Brand Name Dispense Instructions for use Diagnosis    acyclovir 5 % cream    ZOVIRAX    5 g    Apply topically 5 times daily    Genital herpes       amitriptyline 25 MG tablet    ELAVIL    90 tablet    Take 1 tablet (25 mg) by mouth nightly as needed for sleep    YONI (generalized anxiety disorder)       BENADRYL PO      Take 25 mg by mouth        cholecalciferol 74978 UNITS capsule    VITAMIN D3    8 capsule    Take 1 capsule (50,000 Units) by mouth once a week    Vitamin D deficiency       fluticasone 50 MCG/ACT spray    FLONASE     Reported on 5/8/2017        meclizine 25 MG tablet    ANTIVERT    20 tablet    Take 0.5-1 tablets (12.5-25 mg) by mouth every 6 hours as needed for dizziness    BPV (benign positional vertigo), unspecified laterality       XANAX 0.5 MG tablet   Generic drug:  ALPRAZolam      Reported on 5/8/2017

## 2017-06-26 ENCOUNTER — TELEPHONE (OUTPATIENT)
Dept: FAMILY MEDICINE | Facility: OTHER | Age: 52
End: 2017-06-26

## 2017-06-26 LAB — COPATH REPORT: NORMAL

## 2017-06-26 NOTE — TELEPHONE ENCOUNTER
Reason for Call:  Other call back    Detailed comments: Pt stated Jose Luis left her message regarding lab results. She is returning her call.    Phone Number Patient can be reached at: Home number on file [phone] (home)    Best Time: anytime     Can we leave a detailed message on this number? YES    Call taken on 6/26/2017 at 1:27 PM by Macrina Becker

## 2017-06-26 NOTE — TELEPHONE ENCOUNTER
I am reviewing for the provider who is away. Benign endometrial biopsy with possible small benign polyp. Please notify patient.   Quincy Anderson MD

## 2017-06-27 NOTE — TELEPHONE ENCOUNTER
Informed Moira -  She will look at her schedule and then call and schedule an US -   The order is placed

## 2017-06-27 NOTE — TELEPHONE ENCOUNTER
Patient states she was told to have an ultrasound regarding below in a couple of months. Is wondering if she has to wait to get the ultrasound or if she can get it sooner?   [phone].

## 2017-06-29 ENCOUNTER — RADIANT APPOINTMENT (OUTPATIENT)
Dept: ULTRASOUND IMAGING | Facility: OTHER | Age: 52
End: 2017-06-29
Attending: OBSTETRICS & GYNECOLOGY
Payer: COMMERCIAL

## 2017-06-29 DIAGNOSIS — N92.1 MENORRHAGIA WITH IRREGULAR CYCLE: ICD-10-CM

## 2017-06-29 PROCEDURE — 76830 TRANSVAGINAL US NON-OB: CPT

## 2017-06-29 PROCEDURE — 76856 US EXAM PELVIC COMPLETE: CPT

## 2017-07-03 DIAGNOSIS — N83.209 OVARIAN CYST: Primary | ICD-10-CM

## 2017-07-07 ENCOUNTER — TELEPHONE (OUTPATIENT)
Dept: OBGYN | Facility: OTHER | Age: 52
End: 2017-07-07

## 2017-07-07 NOTE — TELEPHONE ENCOUNTER
Reason for Call:  Other     Detailed comments: pt states wondering why there is another order for pelvic ultrasound for ovarian cyst because pt states wasn't told she needed another ultrasound . Pt states had this ultrasound on 06/29/17. Please advise and contact pt in regards.    Phone Number Patient can be reached at: Cell number on file:    Telephone Information:   Mobile [phone]       Best Time: ANY    Can we leave a detailed message on this number? YES    Call taken on 7/7/2017 at 4:13 PM by Rozina Yanez

## 2017-07-07 NOTE — TELEPHONE ENCOUNTER
Provider to review and advise. LM informing the patient GG will return to clinic Monday 07/10/2017. Rebecca Sloan RN, BSN

## 2017-07-08 NOTE — TELEPHONE ENCOUNTER
Please let patient know I had told her she had a small cyst on her ovary which is likely benign but I recommended she repeat the ultrasound in 6 months to one year to see if there is resolution of this cyst.    Paulette Amaya

## 2017-07-10 NOTE — TELEPHONE ENCOUNTER
Lm for the patient to return call to the clinic to hear GG note below. Will await to hear from patient. Rebecca Sloan RN, BSN

## 2017-07-13 NOTE — TELEPHONE ENCOUNTER
Notified patient of results, she did have additional questions for RN regarding what you can be doing or taking for menopausal symptoms.  Please call to advise.

## 2017-07-17 NOTE — TELEPHONE ENCOUNTER
Multiple attempts have been made to reach patient with no response.  Will close encounter at this time.    Terrence Adams RN, BSN

## 2017-08-07 ENCOUNTER — TELEPHONE (OUTPATIENT)
Dept: FAMILY MEDICINE | Facility: OTHER | Age: 52
End: 2017-08-07

## 2017-08-07 NOTE — TELEPHONE ENCOUNTER
Summary:    Patient is due/failing the following:   COLONOSCOPY    Action needed:   Schedule a colonoscopy or complete a FIT test     Type of outreach:    Sent letter.    Questions for provider review:    None                                                                                                                                    Sarahi Blackwell       Chart routed to Care Team .    Panel Management Review      Patient has the following on her problem list: None      Composite cancer screening  Chart review shows that this patient is due/due soon for the following Colonoscopy

## 2017-11-13 ENCOUNTER — TELEPHONE (OUTPATIENT)
Dept: FAMILY MEDICINE | Facility: OTHER | Age: 52
End: 2017-11-13

## 2017-11-13 DIAGNOSIS — Z12.11 SPECIAL SCREENING FOR MALIGNANT NEOPLASMS, COLON: Primary | ICD-10-CM

## 2017-11-13 NOTE — TELEPHONE ENCOUNTER
Summary:    Patient is due/failing the following:   FIT and MAMMOGRAM    Action needed:   Schedule a mammogram and complete a FIT test     Type of outreach:    Phone, spoke to patient.  patient will call back to schedule a mammogram     Questions for provider review:    None                                                                                                                                    Sarahi Blackwell       Chart routed to Care Team .    Panel Management Review      Patient has the following on her problem list: None      Composite cancer screening  Chart review shows that this patient is due/due soon for the following Mammogram and Fecal Colorectal (FIT)

## 2017-11-30 ENCOUNTER — RADIANT APPOINTMENT (OUTPATIENT)
Dept: MAMMOGRAPHY | Facility: CLINIC | Age: 52
End: 2017-11-30
Attending: NURSE PRACTITIONER
Payer: COMMERCIAL

## 2017-11-30 DIAGNOSIS — Z12.31 ENCOUNTER FOR SCREENING MAMMOGRAM FOR BREAST CANCER: ICD-10-CM

## 2017-11-30 PROCEDURE — G0202 SCR MAMMO BI INCL CAD: HCPCS | Performed by: RADIOLOGY

## 2017-12-04 PROCEDURE — 82274 ASSAY TEST FOR BLOOD FECAL: CPT | Performed by: FAMILY MEDICINE

## 2017-12-05 DIAGNOSIS — Z12.11 SPECIAL SCREENING FOR MALIGNANT NEOPLASMS, COLON: ICD-10-CM

## 2017-12-05 LAB — HEMOCCULT STL QL IA: NEGATIVE

## 2018-01-10 ENCOUNTER — TELEPHONE (OUTPATIENT)
Dept: FAMILY MEDICINE | Facility: OTHER | Age: 53
End: 2018-01-10

## 2018-01-10 DIAGNOSIS — N83.202 LEFT OVARIAN CYST: Primary | ICD-10-CM

## 2018-01-10 NOTE — TELEPHONE ENCOUNTER
"Electronically communicated with MB. Patient would like to know if she should still complete the Complete Pelvic with Transvaginal Ultrasound. Appears was ordered on 07/03/2017 by HANSEL.     Per telephone note dated 07/07/2017, \"Please let patient know I had told her she had a small cyst on her ovary which is likely benign but I recommended she repeat the ultrasound in 6 months to one year to see if there is resolution of this cyst. Paulette Amaya.\"    RN extended the Ultrasound expiration date as it should be completed between now and 07/2018. Please assist with scheduling Ultrasound when the patient returns call to the clinic. Rebecca Sloan, RN, BSN     "

## 2018-01-10 NOTE — TELEPHONE ENCOUNTER
LM for the patient to return call to the clinic to clarify what Ultrasound is in question. Will await to hear from patient. Rebecca Sloan RN, BSN

## 2018-01-10 NOTE — TELEPHONE ENCOUNTER
Reason for call:  Other  Reason for Call: Request for an order or referral:    Order or referral being requested: ultrasound    Date needed: as soon as possible    Has the patient been seen by the PCP for this problem? YES    Additional comments: pt called to schedule an ultrasound and the ultrasound . Pt wants to know if she still wants her to do it.    Phone number Patient can be reached at:  Home number on file [phone] (home)    Best Time:  anytime    Can we leave a detailed message on this number?  YES    Call taken on 1/10/2018 at 12:54 PM by Johanny Ibrahim

## 2018-01-11 ENCOUNTER — TELEPHONE (OUTPATIENT)
Dept: FAMILY MEDICINE | Facility: OTHER | Age: 53
End: 2018-01-11

## 2018-01-11 NOTE — TELEPHONE ENCOUNTER
I have lots of openings Thursday if that works for her.     She can review the information from the [employer] at www.menopause.org.    Specific recommendations for hot flashes:  http://www.menopause.org/docs/default-source/2015/mn-hot-flashes.pdf

## 2018-01-11 NOTE — TELEPHONE ENCOUNTER
[facility] phone call message- patient reporting a symptom:    Symptom or request: hot flashes    Duration (how long have symptoms been present): ongoing  Have you been treated for this before? No    Additional comments: I helped patient set up a appt, is there anything else she can do in the meantime?    Call taken on 1/11/2018 at 5:08 PM by Eugenia Delgado

## 2018-01-11 NOTE — TELEPHONE ENCOUNTER
MM : Please review and advise. Patient is asking if you would be able to work in her Monday 01/15/2018 for concern of Hot flashes. Patient would also like to know if she is able to try a herbal supplement that is made of Black cohosh, soy isoflabones, magnalium bark and ginkgobiloba. If able would like an answer by Friday around 12:30, as this is when she is at lunch, otherwise able to leave a detailed message.     Moira Boykin is a 52 year old female who calls with hot flashes.    NURSING ASSESSMENT:  Description:  Experiencing an increase of hot flashes since 2017. From her head to toes feels like she will break out in a sweat. Has been trying to tolerate them. Would like to try an herbal supplement made up of: Black cohosh, soy isoflabones, magnalium bark and ginkgobiloba. Denies hysterectomy.   Onset/duration:  Ongoing hot flashes   Precip. factors:  none  Associated symptoms:  Hot flashes  Improves/worsens symptoms:  Worsening   Pain scale (0-10)   0/10  LMP/preg/breast feedin2017 unknown dates spotting  Last exam/Treatment:  2017  Allergies:   Allergies   Allergen Reactions     Sulfa Drugs      Amoxicillin Rash     Augmentin Rash     NURSING PLAN: Nursing advice to patient home care measues for hot flashes    RECOMMENDED DISPOSITION:  Home care advice - for hot flashes   Will comply with recommendation: Yes  If further questions/concerns or if symptoms do not improve, worsen or new symptoms develop, call your PCP or [facility] as soon as possible.    NOTES:  Disposition was determined by the first positive assessment question, therefore all previous assessment questions were negative    Guideline used:  Telephone Triage for Obstetrics and Gynecology, Jennifer Cosme and Alana Becerril  Hot Flashes/Sweats and Hormone Therapy  Nursing Judgment  Routing to MM    Rebecca Sloan, RN, BSN

## 2018-01-11 NOTE — TELEPHONE ENCOUNTER
Left message for patient to return call, she can be scheduled for repeat ultrasound when call is returned.

## 2018-01-11 NOTE — TELEPHONE ENCOUNTER
I saw patient last June and an ultrasound was done which showed a small left ovarian cyst.  I recommended she repeat the ultrasound in 6-12months.  The order has been extended for patient to get this done sometime before July.    Paulette Amaya

## 2018-01-15 ENCOUNTER — RADIANT APPOINTMENT (OUTPATIENT)
Dept: ULTRASOUND IMAGING | Facility: OTHER | Age: 53
End: 2018-01-15
Attending: OBSTETRICS & GYNECOLOGY
Payer: COMMERCIAL

## 2018-01-15 DIAGNOSIS — N83.202 LEFT OVARIAN CYST: ICD-10-CM

## 2018-01-15 PROCEDURE — 76830 TRANSVAGINAL US NON-OB: CPT

## 2018-01-15 PROCEDURE — 76856 US EXAM PELVIC COMPLETE: CPT

## 2018-01-18 ENCOUNTER — OFFICE VISIT (OUTPATIENT)
Dept: OBGYN | Facility: OTHER | Age: 53
End: 2018-01-18
Payer: COMMERCIAL

## 2018-01-18 VITALS
SYSTOLIC BLOOD PRESSURE: 110 MMHG | DIASTOLIC BLOOD PRESSURE: 70 MMHG | HEIGHT: 65 IN | WEIGHT: 145.25 LBS | HEART RATE: 72 BPM | BODY MASS INDEX: 24.2 KG/M2

## 2018-01-18 DIAGNOSIS — N95.1 MENOPAUSAL SYNDROME (HOT FLASHES): Primary | ICD-10-CM

## 2018-01-18 PROCEDURE — 99214 OFFICE O/P EST MOD 30 MIN: CPT | Performed by: OBSTETRICS & GYNECOLOGY

## 2018-01-18 NOTE — NURSING NOTE
"Chief Complaint   Patient presents with     Menopausal Sx     hot flashes       Initial /70 (BP Location: Right arm, Patient Position: Chair, Cuff Size: Adult Regular)  Pulse 72  Ht 5' 5.43\" (1.662 m)  Wt 145 lb 4 oz (65.9 kg)  LMP 05/20/2017 (Approximate)  BMI 23.85 kg/m2 Estimated body mass index is 23.85 kg/(m^2) as calculated from the following:    Height as of this encounter: 5' 5.43\" (1.662 m).    Weight as of this encounter: 145 lb 4 oz (65.9 kg).  BP completed using cuff size: regular    No obstetric history on file.    The following HM Due: NONE      The following patient reported/Care Every where data was sent to:  P ABSTRACT QUALITY INITIATIVES [[id number]]       N/a    Mely Garcia, [facility]  January 18, 2018           "

## 2018-01-18 NOTE — PROGRESS NOTES
OB/GYN   1/18/2018      NAME:  Moira Boykin  PCP:  MarcoMely bonillaet  MRN:  [id number]      Impression / Plan     52 year old with:      ICD-10-CM    1. Menopausal syndrome (hot flashes) N95.1      Body mass index is 23.85 kg/(m^2).     Discussed menopause symptoms.  Discussed management options.  Handouts given from the [employer] Menopause Society regarding hormone therapy, hot flashes, sleep problems, and bioidentical hormone therapy.    Discussed the risk of hormone replacement therapy.  Her CVD risk score based on the American Menopause Society calculator is 1.7 % (low risk) over 10 years.    Patient will try conservative therapy for now. She will follow up in the next month or two.  She may consider HRT.  Discussed bioidentical hormone therapy.  Discussed FDA approved therapy.    All questions answered and the patient is in agreement with plan.        Chief Complaint     Chief Complaint   Patient presents with     Menopausal Sx     hot flashes       HPI     Moira Boykin is a 52 year old female who is seen for hot flashes.  This has been getting worse over the last couple of months.  She has at least 10 per day. She is having night sweats. She is sleeping in the cold down stairs with no blankets.  She is not sleeping well.  It is making it difficult to work.      She also complains of low libido. Mood changes, more irritable.       She is taking amitriptyline for anxiety and depression.    Patient's last menstrual period was 05/20/2017 (approximate).  No vaginal bleeding since.    FSH elevated in Nov 2016 consistent with perimenopause.      Labs reviewed - Cholesterol normal.    She is a smoker.     Date of Last Pap Smear:   Lab Results   Component Value Date    PAP ASC-US 05/08/2017       Problem List     Patient Active Problem List    Diagnosis Date Noted     Tobacco abuse      Priority: Medium     BPPV (benign paroxysmal positional vertigo), right 08/22/2016     Priority: Medium     Vertigo  2016     Priority: Medium     Chronic fatigue 2016     Priority: Medium     Papanicolaou smear of cervix with low grade squamous intraepithelial lesion (LGSIL) 2016     Priority: Medium     16 pap LSIL/neg HR HPV. Plan: pending provider review and gyn referral.   16 US visit for abnormal bleeding.   16 FU with Dr. Amaya on LSIL and vaginal bleeding.   16 [place] visit:  Plan: D&C soon. Cotest in 1 year   17 ASCUS pap/neg HR HPV. Plan: colp bef 17 [place] neg. Plan: cotest in 1 year, but scheduled EMB now.         Concussion without loss of consciousness 2015     Priority: Medium     CARDIOVASCULAR SCREENING; LDL GOAL LESS THAN 160 10/31/2010     Priority: Medium     Anxiety state 2007     Priority: Medium     Problem list name updated by automated process. Provider to review       Other genital herpes 1993     Priority: Medium       Medications     Current Outpatient Prescriptions   Medication     Ascorbic Acid (VITAMIN C PO)     Cyanocobalamin (VITAMIN B 12 PO)     amitriptyline (ELAVIL) 25 MG tablet     cholecalciferol (VITAMIN D3) 48755 UNITS capsule     DiphenhydrAMINE HCl (BENADRYL PO)     ALPRAZolam (XANAX) 0.5 MG tablet     fluticasone (FLONASE) 50 MCG/ACT nasal spray     meclizine (ANTIVERT) 25 MG tablet     acyclovir (ZOVIRAX) 5 % cream     No current facility-administered medications for this visit.         Allergies     Allergies   Allergen Reactions     Sulfa Drugs      Amoxicillin Rash     Augmentin Rash       Past Medical/Surgical History     Past Medical History:   Diagnosis Date     ASCUS of cervix with negative high risk HPV 2017     Papanicolaou smear of cervix with low grade squamous intraepithelial lesion (LGSIL) 16    neg HR HPV     Tobacco abuse        Past Surgical History:   Procedure Laterality Date     C ANESTH, SECTION      times 2        Social History     Social History     Social History     Marital  "status:      Spouse name: N/A     Number of children: N/A     Years of education: N/A     Occupational History     Not on file.     Social History Main Topics     Smoking status: Light Tobacco Smoker     Packs/day: 0.20     Types: Cigarettes     Smokeless tobacco: Never Used      Comment: Just using E-Cig Now     Alcohol use No     Drug use: No     Sexual activity: Yes     Partners: Male     Birth control/ protection: Surgical      Comment: tubal ligation     Other Topics Concern     Parent/Sibling W/ Cabg, Mi Or Angioplasty Before 65f 55m? Yes     father late 40s     Social History Narrative       Family History      Family History   Problem Relation Age of Onset     Hypertension Mother      GASTROINTESTINAL DISEASE Mother      liver and gallbladder swelling     Alcohol/Drug Mother      Depression Father      Cardiovascular Maternal Grandfather      congestive heart failure     HEART DISEASE Maternal Grandfather      congestive heart failure     Breast Cancer No family hx of      Anesthesia Reaction No family hx of      OSTEOPOROSIS No family hx of      Genetic Disorder No family hx of      Thyroid Disease No family hx of      Obesity No family hx of      Asthma No family hx of      Substance Abuse No family hx of      Colon Cancer No family hx of      Prostate Cancer No family hx of        ROS     A 6 organ review of systems was asked and the pertinent positives and negatives are listed in the HPI. All other organ systems can be considered negative.     Physical Exam   Vitals: /70 (BP Location: Right arm, Patient Position: Chair, Cuff Size: Adult Regular)  Pulse 72  Ht 5' 5.43\" (1.662 m)  Wt 145 lb 4 oz (65.9 kg)  LMP 05/20/2017 (Approximate)  BMI 23.85 kg/m2    General: Comfortable, no obvious distress, normal  body habitus  Psych: Alert and orientated x 3. Appropriate affect, good insight.   : deferred      Labs/Imaging       Labs were reviewed in [facility]     Imaging was reviewed in [facility]. "       30 minutes was spent with patient, more than 50% counseling and coordinating care      Johanny Velarde MD

## 2018-01-18 NOTE — MR AVS SNAPSHOT
"              After Visit Summary   2018    Moira Boykin    MRN: [id number]           Patient Information     Date Of Birth          1965        Visit Information        Provider Department      2018 4:15 PM Johanny Velarde MD [facility]        Today's Diagnoses     Menopausal syndrome (hot flashes)    -  1       Follow-ups after your visit        Follow-up notes from your care team     Return if symptoms worsen or fail to improve.      Who to contact     If you have questions or need follow up information about today's clinic visit or your schedule please contact [facility] directly at [phone].  Normal or non-critical lab and imaging results will be communicated to you by MyChart, letter or phone within 4 business days after the clinic has received the results. If you do not hear from us within 7 days, please contact the clinic through MyChart or phone. If you have a critical or abnormal lab result, we will notify you by phone as soon as possible.  Submit refill requests through [employer] or call your pharmacy and they will forward the refill request to us. Please allow 3 business days for your refill to be completed.          Additional Information About Your Visit        MyChart Information     [employer] lets you send messages to your doctor, view your test results, renew your prescriptions, schedule appointments and more. To sign up, go to [email or web address] . Click on \"Log in\" on the left side of the screen, which will take you to the Welcome page. Then click on \"Sign up Now\" on the right side of the page.     You will be asked to enter the access code listed below, as well as some personal information. Please follow the directions to create your username and password.     Your access code is: [email or web address]  Expires: 2018  6:35 PM     Your access code will  in 90 days. If you need help or a new code, please call your [facility] or " "[phone].        Care EveryWhere ID     This is your Care EveryWhere ID. This could be used by other organizations to access your [facility] medical records  [id number]        Your Vitals Were     Pulse Height Last Period BMI (Body Mass Index)          72 5' 5.43\" (1.662 m) 05/20/2017 (Approximate) 23.85 kg/m2         Blood Pressure from Last 3 Encounters:   01/18/18 110/70   06/23/17 128/88   06/05/17 126/84    Weight from Last 3 Encounters:   01/18/18 145 lb 4 oz (65.9 kg)   06/23/17 140 lb (63.5 kg)   06/05/17 145 lb (65.8 kg)              Today, you had the following     No orders found for display       Primary Care Provider Office Phone # Fax #    Mely Corona [phone]       [address] [facility] [address]        Equal Access to Services     KASSIDY BETANCOURT AH: Hadii adria estevez hadasho Soomaali, waaxda luqadaha, qaybta kaalmada adeegyada, eris flanagan . So [facility] [phone].    ATENCIÓN: Si lela espjudy, tiene a parham disposición servicios gratuitos de asistencia lingüística. Llame al [phone].    We comply with applicable federal civil rights laws and [place] laws. We do not discriminate on the basis of race, color, national origin, age, disability, sex, sexual orientation, or gender identity.            Thank you!     Thank you for choosing [facility]  for your care. Our goal is always to provide you with excellent care. Hearing back from our patients is one way we can continue to improve our services. Please take a few minutes to complete the written survey that you may receive in the mail after your visit with us. Thank you!             Your Updated Medication List - Protect others around you: Learn how to safely use, store and throw away your medicines at www.disposemymeds.org.          This list is accurate as of: 1/18/18 11:59 PM.  Always use your most recent med list.                   Brand Name Dispense Instructions for use " Diagnosis    acyclovir 5 % cream    ZOVIRAX    5 g    Apply topically 5 times daily    Genital herpes       amitriptyline 25 MG tablet    ELAVIL    90 tablet    Take 1 tablet (25 mg) by mouth nightly as needed for sleep    YONI (generalized anxiety disorder)       BENADRYL PO      Take 25 mg by mouth        cholecalciferol 35323 UNITS capsule    VITAMIN D3    8 capsule    Take 1 capsule (50,000 Units) by mouth once a week    Vitamin D deficiency       fluticasone 50 MCG/ACT spray    FLONASE     Reported on 5/8/2017        meclizine 25 MG tablet    ANTIVERT    20 tablet    Take 0.5-1 tablets (12.5-25 mg) by mouth every 6 hours as needed for dizziness    BPV (benign positional vertigo), unspecified laterality       VITAMIN B 12 PO           VITAMIN C PO           XANAX 0.5 MG tablet   Generic drug:  ALPRAZolam      Reported on 5/8/2017

## 2018-05-29 ENCOUNTER — TELEPHONE (OUTPATIENT)
Dept: FAMILY MEDICINE | Facility: OTHER | Age: 53
End: 2018-05-29

## 2018-05-29 NOTE — TELEPHONE ENCOUNTER
CHERELLE is not in the clinic tomorrow.   Will route request to SF to review and advise if it is ok to schedule patient in the 1:40 same day appt slot?

## 2018-05-29 NOTE — TELEPHONE ENCOUNTER
Reason for Call:  Other appointment    Detailed comments: pt is scheduled tomorrow with DA for a physical but was wondering if KL was available instead (she feels more comfortable with a female) but if not, she'll keep her 4 PM tomorrow. Please advise.     Phone Number Patient can be reached at: Home number on file [phone] (home)    Best Time: any     Can we leave a detailed message on this number? YES    Call taken on 5/29/2018 at 4:09 PM by Dee Stovall

## 2018-05-30 ENCOUNTER — OFFICE VISIT (OUTPATIENT)
Dept: FAMILY MEDICINE | Facility: CLINIC | Age: 53
End: 2018-05-30
Payer: COMMERCIAL

## 2018-05-30 VITALS
RESPIRATION RATE: 18 BRPM | DIASTOLIC BLOOD PRESSURE: 72 MMHG | SYSTOLIC BLOOD PRESSURE: 118 MMHG | TEMPERATURE: 97.3 F | BODY MASS INDEX: 24.16 KG/M2 | HEIGHT: 65 IN | OXYGEN SATURATION: 98 % | HEART RATE: 78 BPM | WEIGHT: 145 LBS

## 2018-05-30 DIAGNOSIS — L50.9 URTICARIA: ICD-10-CM

## 2018-05-30 DIAGNOSIS — Z00.00 ENCOUNTER FOR ROUTINE ADULT HEALTH EXAMINATION WITHOUT ABNORMAL FINDINGS: Primary | ICD-10-CM

## 2018-05-30 DIAGNOSIS — F41.1 GAD (GENERALIZED ANXIETY DISORDER): ICD-10-CM

## 2018-05-30 DIAGNOSIS — R87.612 PAPANICOLAOU SMEAR OF CERVIX WITH LOW GRADE SQUAMOUS INTRAEPITHELIAL LESION (LGSIL): ICD-10-CM

## 2018-05-30 DIAGNOSIS — F41.1 ANXIETY STATE: ICD-10-CM

## 2018-05-30 PROCEDURE — 99396 PREV VISIT EST AGE 40-64: CPT | Performed by: FAMILY MEDICINE

## 2018-05-30 PROCEDURE — 88175 CYTOPATH C/V AUTO FLUID REDO: CPT | Performed by: FAMILY MEDICINE

## 2018-05-30 PROCEDURE — 87624 HPV HI-RISK TYP POOLED RSLT: CPT | Performed by: FAMILY MEDICINE

## 2018-05-30 PROCEDURE — 99213 OFFICE O/P EST LOW 20 MIN: CPT | Mod: 25 | Performed by: FAMILY MEDICINE

## 2018-05-30 RX ORDER — AMITRIPTYLINE HYDROCHLORIDE 10 MG/1
10 TABLET ORAL AT BEDTIME
Qty: 90 TABLET | Refills: 3 | Status: SHIPPED | OUTPATIENT
Start: 2018-05-30 | End: 2019-06-11

## 2018-05-30 ASSESSMENT — ANXIETY QUESTIONNAIRES
2. NOT BEING ABLE TO STOP OR CONTROL WORRYING: NOT AT ALL
3. WORRYING TOO MUCH ABOUT DIFFERENT THINGS: SEVERAL DAYS
IF YOU CHECKED OFF ANY PROBLEMS ON THIS QUESTIONNAIRE, HOW DIFFICULT HAVE THESE PROBLEMS MADE IT FOR YOU TO DO YOUR WORK, TAKE CARE OF THINGS AT HOME, OR GET ALONG WITH OTHER PEOPLE: SOMEWHAT DIFFICULT
5. BEING SO RESTLESS THAT IT IS HARD TO SIT STILL: SEVERAL DAYS
GAD7 TOTAL SCORE: 5
1. FEELING NERVOUS, ANXIOUS, OR ON EDGE: SEVERAL DAYS
7. FEELING AFRAID AS IF SOMETHING AWFUL MIGHT HAPPEN: NOT AT ALL
6. BECOMING EASILY ANNOYED OR IRRITABLE: SEVERAL DAYS

## 2018-05-30 ASSESSMENT — PATIENT HEALTH QUESTIONNAIRE - PHQ9: 5. POOR APPETITE OR OVEREATING: SEVERAL DAYS

## 2018-05-30 ASSESSMENT — PAIN SCALES - GENERAL: PAINLEVEL: NO PAIN (0)

## 2018-05-30 NOTE — PROGRESS NOTES
SUBJECTIVE:   CC: Moira Boykin is an 52 year old woman who presents for preventive health visit.     Physical   Annual:     Getting at least 3 servings of Calcium per day::  Yes    Bi-annual eye exam::  Yes    Dental care twice a year::  Yes    Sleep apnea or symptoms of sleep apnea::  None    Diet::  Regular (no restrictions)    Frequency of exercise::  2-3 days/week    Duration of exercise::  45-60 minutes    Taking medications regularly::  Yes    Medication side effects::  Lightheadedness and None    Additional concerns today::  YES          Rash - Hives  Onset: on and off for more than one year    Description:   Location: stomach, back   Character: blotchy, burning  Itching (Pruritis): YES    Progression of Symptoms:  worsening    Accompanying Signs & Symptoms:  Fever: no   Body aches or joint pain: no   Sore throat symptoms: YES  Recent cold symptoms: no     History:   Previous similar rash: YES    Precipitating factors:   Exposure to similar rash: no   New exposures: None   Recent travel: no     Alleviating factors:  Peppermint oil    Therapies Tried and outcome: Peppermint oil    She has been getting hives off and on. Started coming on this morning when she got out of the shower they appeared on her back. Had a big welt on her stomach on Sunday. Red patches, very pruritic. The peppermint oil seems to help with the itching a bit. She isn't sure if these are stress related, or have something to do with menopause. She never had these hives before she started menopause. When she was getting her period last year she didn't get any hives.  Sometimes she will go for some time without having any hives, and other times she gets them every day. They bother her the most when she gets them on her back. Their might have been a time where she didn't get any for a couple of weeks. Tiff Rios had recommended she take some benadryl for the hives, but she is worried that the benadryl combined with the amitriptyline  "will make her too tired. She cannot think of any patterns to the hives other than that they may be stress related, she has thought about the soaps and detergents she is using, but can't see any correlation.       Mood  She is working with a therapist. She has been on amitriptyline for her mood. She reports that she is often sad but 'tries to get by\". She reports that her son is not happy about her remarriage. She reports that it makes her feel bad due to that.     Today's PHQ-2 Score:   PHQ-2 ( 1999 [employer]) 5/30/2018   Q1: Little interest or pleasure in doing things 1   Q2: Feeling down, depressed or hopeless 1   PHQ-2 Score 2   Q1: Little interest or pleasure in doing things Several days   Q2: Feeling down, depressed or hopeless Several days   PHQ-2 Score 2     PHQ-9 SCORE 1/8/2016   Total Score 2     YONI-7 SCORE 11/11/2011 1/8/2016 5/8/2017   Total Score 1 - -   Total Score - 1 3     Social History   Substance Use Topics     Smoking status: Light Tobacco Smoker     Packs/day: 0.20     Types: Cigarettes     Smokeless tobacco: Never Used      Comment: Just using E-Cig Now     Alcohol use No     Alcohol Use 5/30/2018   If you drink alcohol do you typically have greater than 3 drinks per day OR greater than 7 drinks per week? No     Reviewed orders with patient.  Reviewed health maintenance and updated orders accordingly - Yes  BP Readings from Last 3 Encounters:   05/30/18 118/72   01/18/18 110/70   06/23/17 128/88    Wt Readings from Last 3 Encounters:   05/30/18 65.8 kg (145 lb)   01/18/18 65.9 kg (145 lb 4 oz)   06/23/17 63.5 kg (140 lb)         Patient over age 50, mutual decision to screen reflected in health maintenance.    Pertinent mammograms are reviewed under the imaging tab.  History of abnormal Pap smear: YES - other categories - see link Cervical Cytology Screening Guidelines    Reviewed and updated as needed this visit by clinical staff  Tobacco  Allergies  Meds  Med Hx  Surg Hx  Fam Hx  Soc Hx    " "  Reviewed and updated as needed this visit by Provider        Past Medical History:   Diagnosis Date     ASCUS of cervix with negative high risk HPV 2017     Papanicolaou smear of cervix with low grade squamous intraepithelial lesion (LGSIL) 16    neg HR HPV     Tobacco abuse       Past Surgical History:   Procedure Laterality Date     C ANESTH, SECTION      times 2       Review of Systems  CONSTITUTIONAL: NEGATIVE for fever, chills, change in weight  INTEGUMENTARY/SKIN: NEGATIVE for worrisome rashes, moles or lesions  EYES: NEGATIVE for vision changes or irritation  ENT: NEGATIVE for ear, mouth and throat problems  RESP: NEGATIVE for significant cough or SOB  BREAST: NEGATIVE for masses, tenderness or discharge  CV: NEGATIVE for chest pain, palpitations or peripheral edema  GI: NEGATIVE for nausea, abdominal pain, heartburn, or change in bowel habits  : NEGATIVE for unusual urinary or vaginal symptoms. No vaginal bleeding.  MUSCULOSKELETAL: NEGATIVE for significant arthralgias or myalgia  NEURO: NEGATIVE for weakness, dizziness or paresthesias  PSYCHIATRIC: NEGATIVE for changes in mood or affect     This document serves as a record of the services and decisions personally performed and made by Charlotte Santos MD. It was created on her behalf by Citlalli Johnson, a trained medical scribe. The creation of this document is based the provider's statements to the medical scribe.  Citlalli Johnson, May 30, 2018 2:13 PM      OBJECTIVE:   /72  Pulse 78  Temp 97.3  F (36.3  C) (Temporal)  Resp 18  Ht 1.651 m (5' 5\")  Wt 65.8 kg (145 lb)  SpO2 98%  BMI 24.13 kg/m2  Physical Exam  GENERAL APPEARANCE: healthy, alert, very tearful  EYES: Eyes grossly normal to inspection, PERRL and conjunctivae and sclerae normal  HENT: ear canals and TM's normal, nose and mouth without ulcers or lesions, oropharynx clear and oral mucous membranes moist  NECK: no adenopathy, no asymmetry, masses, or scars and thyroid " normal to palpation  RESP: lungs clear to auscultation - no rales, rhonchi or wheezes  BREAST: normal without masses, tenderness or nipple discharge and no palpable axillary masses or adenopathy  CV: regular rate and rhythm, normal S1 S2, no S3 or S4, no murmur, click or rub, no peripheral edema and peripheral pulses strong  ABDOMEN: soft, nontender, no hepatosplenomegaly, no masses and bowel sounds normal   (female): normal female external genitalia, normal urethral meatus, vaginal mucosal atrophy noted, normal cervix, adnexae, and uterus without masses or abnormal discharge  MS: no musculoskeletal defects are noted and gait is age appropriate without ataxia  SKIN: no suspicious lesions or rashes  NEURO: Normal strength and tone, sensory exam grossly normal, mentation intact and speech normal  PSYCH: mentation appears normal and affect normal/bright    ASSESSMENT/PLAN:   1. Encounter for routine adult health examination without abnormal findings    2. Urticaria  No hives noted now. Patient has reported this has been going off and on for a year.   Discussed trial of zyrtec daily to control. She declines.   Discussed referral to allergy which she declines.   She has been using peppermint oil and would like to continue with this.   Will recheck as needed.       3. Papanicolaou smear of cervix with low grade squamous intraepithelial lesion (LGSIL)  Due for repeat PAP smear, was ASCUS on last PAP, no high risk HPV.   - Pap imaged thin layer diagnostic with HPV (select HPV order below)    4. Anxiety state  Reports being too sleepy with the 25 mg dose.   Decrease to 10 mg at bedtime.   Will call back as needed.   - amitriptyline (ELAVIL) 10 MG tablet; Take 1 tablet (10 mg) by mouth At Bedtime  Dispense: 90 tablet; Refill: 3    5. YONI (generalized anxiety disorder)  Seems anxious today and has been having concerns with family.   Continue with counseling.   Discussed medication but she declines.   Will recheck if any  "concerns.       COUNSELING:  Reviewed preventive health counseling, as reflected in patient instructions   reports that she has been smoking Cigarettes.  She has been smoking about 0.20 packs per day. She has never used smokeless tobacco.  Tobacco Cessation Action Plan: Information offered: Patient not interested at this time  Estimated body mass index is 24.13 kg/(m^2) as calculated from the following:    Height as of this encounter: 1.651 m (5' 5\").    Weight as of this encounter: 65.8 kg (145 lb).       Counseling Resources:  ATP IV Guidelines  Pooled Cohorts Equation Calculator  Breast Cancer Risk Calculator  FRAX Risk Assessment  ICSI Preventive Guidelines  Dietary Guidelines for Americans, 2010  USDA's MyPlate  ASA Prophylaxis  Lung CA Screening    The information in this document, created by the medical scribe for me, accurately reflects the services I personally performed and the decisions made by me. I have reviewed and approved this document for accuracy.     AYLIN CHAVES MD, MD  [facility]      "

## 2018-05-30 NOTE — MR AVS SNAPSHOT
"              After Visit Summary   5/30/2018    Moira Boykin    MRN: [id number]           Patient Information     Date Of Birth          1965        Visit Information        Provider Department      5/30/2018 1:40 PM Charlotte Santos MD [facility]        Today's Diagnoses     Encounter for routine adult health examination without abnormal findings    -  1    Urticaria        Papanicolaou smear of cervix with low grade squamous intraepithelial lesion (LGSIL)        Anxiety state        YONI (generalized anxiety disorder)           Follow-ups after your visit        Follow-up notes from your care team     Return in about 1 year (around 5/30/2019) for Physical Exam.      Who to contact     If you have questions or need follow up information about today's clinic visit or your schedule please contact [facility] directly at [phone].  Normal or non-critical lab and imaging results will be communicated to you by MyChart, letter or phone within 4 business days after the clinic has received the results. If you do not hear from us within 7 days, please contact the clinic through MyChart or phone. If you have a critical or abnormal lab result, we will notify you by phone as soon as possible.  Submit refill requests through [facility] or call your pharmacy and they will forward the refill request to us. Please allow 3 business days for your refill to be completed.          Additional Information About Your Visit        Care EveryWhere ID     This is your Care EveryWhere ID. This could be used by other organizations to access your [facility] medical records  [id number]        Your Vitals Were     Pulse Temperature Respirations Height Pulse Oximetry BMI (Body Mass Index)    78 97.3  F (36.3  C) (Temporal) 18 5' 5\" (1.651 m) 98% 24.13 kg/m2       Blood Pressure from Last 3 Encounters:   05/30/18 118/72   01/18/18 110/70   06/23/17 128/88    Weight from Last 3 Encounters:   05/30/18 145 lb " (65.8 kg)   01/18/18 145 lb 4 oz (65.9 kg)   06/23/17 140 lb (63.5 kg)              We Performed the Following     HPV High Risk Types DNA Cervical     Pap imaged thin layer diagnostic with HPV (select HPV order below)          Today's Medication Changes          These changes are accurate as of 5/30/18  2:42 PM.  If you have any questions, ask your nurse or doctor.               These medicines have changed or have updated prescriptions.        Dose/Directions    amitriptyline 10 MG tablet   Commonly known as:  ELAVIL   This may have changed:    - medication strength  - how much to take  - when to take this  - reasons to take this   Used for:  Anxiety state   Changed by:  Charlotte Santos MD        Dose:  10 mg   Take 1 tablet (10 mg) by mouth At Bedtime   Quantity:  90 tablet   Refills:  3            Where to get your medicines      These medications were sent to [place] [address] [place] [facility] - [address]  [address] [place], [place] [address] [address]     Phone:  [phone]     amitriptyline 10 MG tablet                Primary Care Provider Office Phone # Fax #    Mely Karen Corona [phone]       [address] [facility] [address]        Equal Access to Services     KASSIDY BETANCOURT AH: Hadii adria horno Sonoy, waaxda luqadaha, qaybta kaalmada adeegyada, eris beck. So [facility] [phone].    ATENCIÓN: Si habla español, tiene a parham disposición servicios gratuitos de asistencia lingüística. Roddyame al [phone].    We comply with applicable federal civil rights laws and [place] laws. We do not discriminate on the basis of race, color, national origin, age, disability, sex, sexual orientation, or gender identity.            Thank you!     Thank you for choosing [facility]  for your care. Our goal is always to provide you with excellent care. Hearing back from our patients is one way we can continue to improve our services. Please take a few  minutes to complete the written survey that you may receive in the mail after your visit with us. Thank you!             Your Updated Medication List - Protect others around you: Learn how to safely use, store and throw away your medicines at www.disposemymeds.org.          This list is accurate as of 5/30/18  2:42 PM.  Always use your most recent med list.                   Brand Name Dispense Instructions for use Diagnosis    acyclovir 5 % cream    ZOVIRAX    5 g    Apply topically 5 times daily    Genital herpes       amitriptyline 10 MG tablet    ELAVIL    90 tablet    Take 1 tablet (10 mg) by mouth At Bedtime    Anxiety state       BENADRYL PO      Take 25 mg by mouth        cholecalciferol 99624 units capsule    VITAMIN D3    8 capsule    Take 1 capsule (50,000 Units) by mouth once a week    Vitamin D deficiency       fluticasone 50 MCG/ACT spray    FLONASE     Reported on 5/8/2017        meclizine 25 MG tablet    ANTIVERT    20 tablet    Take 0.5-1 tablets (12.5-25 mg) by mouth every 6 hours as needed for dizziness    BPV (benign positional vertigo), unspecified laterality       VITAMIN B 12 PO           VITAMIN C PO           XANAX 0.5 MG tablet   Generic drug:  ALPRAZolam      Reported on 5/8/2017

## 2018-05-30 NOTE — LETTER
June 7, 2018    Moira Boykin  [address] [place] [address]    Dear Moira,  We are happy to inform you that your PAP smear result from 5/30/18 is normal.  We are now able to do a follow up test on PAP smears. The DNA test is for HPV (Human Papilloma Virus). Cervical cancer is closely linked with certain types of HPV. Your results showed no evidence of high risk HPV.  Therefore we recommend you return in 3 years for your next pap smear and HPV test.  You will still need to return to the clinic every year for an annual exam and other preventive tests.  Please contact the clinic at [phone] with any questions.  Sincerely,    AYLIN CHAVES MD, MD/liam

## 2018-05-31 ASSESSMENT — ANXIETY QUESTIONNAIRES: GAD7 TOTAL SCORE: 5

## 2018-05-31 ASSESSMENT — PATIENT HEALTH QUESTIONNAIRE - PHQ9: SUM OF ALL RESPONSES TO PHQ QUESTIONS 1-9: 3

## 2018-06-01 LAB
COPATH REPORT: NORMAL
PAP: NORMAL

## 2018-06-04 DIAGNOSIS — A60.00 GENITAL HERPES SIMPLEX, UNSPECIFIED SITE: ICD-10-CM

## 2018-06-04 RX ORDER — ACYCLOVIR 50 MG/G
CREAM TOPICAL
Qty: 5 G | Refills: 3 | Status: SHIPPED | OUTPATIENT
Start: 2018-06-04

## 2018-06-05 LAB
FINAL DIAGNOSIS: NORMAL
HPV HR 12 DNA CVX QL NAA+PROBE: NEGATIVE
HPV16 DNA SPEC QL NAA+PROBE: NEGATIVE
HPV18 DNA SPEC QL NAA+PROBE: NEGATIVE
SPECIMEN DESCRIPTION: NORMAL
SPECIMEN SOURCE CVX/VAG CYTO: NORMAL

## 2018-09-10 DIAGNOSIS — F41.1 GAD (GENERALIZED ANXIETY DISORDER): ICD-10-CM

## 2019-01-09 ENCOUNTER — TELEPHONE (OUTPATIENT)
Dept: FAMILY MEDICINE | Facility: OTHER | Age: 54
End: 2019-01-09

## 2019-01-09 NOTE — TELEPHONE ENCOUNTER
Summary:    Patient is due/failing the following:   FIT and MAMMOGRAM    Action needed:   Complete a FIT test and schedule a mammogram     Type of outreach:    Phone, left message for patient to call back.     Questions for provider review:    None                                                                                                                                    Sarahi Blackwell       Chart routed to Care Team .        Panel Management Review      Patient has the following on her problem list: None      Composite cancer screening  Chart review shows that this patient is due/due soon for the following Mammogram and Fecal Colorectal (FIT)

## 2019-01-09 NOTE — LETTER
[address]   [facility] [address]  Phone: [phone]  January 31, 2019      Moira Boykin  [address] [place] [address]      Dear Moira,    We care about your health and have reviewed your health plan including your medical conditions, medications, and lab results.  Based on this review, it is recommended that you follow up regarding the following health topic(s):  -Breast Cancer Screening  -Colon Cancer Screening    We recommend you take the following action(s):  -schedule a MAMMOGRAM which is due. Please disregard this reminder if you have had this exam elsewhere within the last 1-2 years please let us know so we can update your records.  -schedule a COLONOSCOPY to look for colon cancer (due every 10 years or 5 years in higher risk situations.)  Colonoscopies can prevent 90-95% of colon cancer deaths.  Problem lesions can be removed before they ever become cancer.  If you do not wish to do a colonoscopy or cannot afford to do one at this time, there is another option called a Fecal Immunochemical Occult Blood Test (FIT) a take home stool sample kit.  It does not replace the colonoscopy for colorectal cancer screening, but it can detect hidden bleeding in the lower colon.  It does need to be repeated every year and if a positive result is obtained, you would be referred for a colonoscopy.  If you have completed either one of these tests at another facility, please have the records sent to our clinic for our records.     Please call us at the [facility] - [phone] (or use [employer]) to address the above recommendations.     Thank you for trusting [facility] and we appreciate the opportunity to serve you.  We look forward to supporting your healthcare needs in the future.    Healthy Regards,    Your Health Care Team  [facility]

## 2019-03-05 ENCOUNTER — OFFICE VISIT (OUTPATIENT)
Dept: FAMILY MEDICINE | Facility: CLINIC | Age: 54
End: 2019-03-05
Payer: COMMERCIAL

## 2019-03-05 VITALS
WEIGHT: 150 LBS | SYSTOLIC BLOOD PRESSURE: 120 MMHG | HEIGHT: 65 IN | TEMPERATURE: 98 F | DIASTOLIC BLOOD PRESSURE: 80 MMHG | BODY MASS INDEX: 24.99 KG/M2 | OXYGEN SATURATION: 100 % | HEART RATE: 75 BPM

## 2019-03-05 DIAGNOSIS — J01.90 ACUTE SINUSITIS TREATED WITH ANTIBIOTICS IN THE PAST 60 DAYS: Primary | ICD-10-CM

## 2019-03-05 LAB
FLUAV+FLUBV AG SPEC QL: NEGATIVE
FLUAV+FLUBV AG SPEC QL: NEGATIVE
SPECIMEN SOURCE: NORMAL

## 2019-03-05 PROCEDURE — 99214 OFFICE O/P EST MOD 30 MIN: CPT | Performed by: PHYSICIAN ASSISTANT

## 2019-03-05 PROCEDURE — 87804 INFLUENZA ASSAY W/OPTIC: CPT | Performed by: PHYSICIAN ASSISTANT

## 2019-03-05 RX ORDER — CEFDINIR 300 MG/1
300 CAPSULE ORAL 2 TIMES DAILY
Qty: 20 CAPSULE | Refills: 0 | Status: SHIPPED | OUTPATIENT
Start: 2019-03-05 | End: 2019-05-15

## 2019-03-05 ASSESSMENT — PAIN SCALES - GENERAL: PAINLEVEL: SEVERE PAIN (7)

## 2019-03-05 ASSESSMENT — MIFFLIN-ST. JEOR: SCORE: 1291.28

## 2019-03-05 NOTE — LETTER
[facility]  [address] [address], [address]  [place] [address]  Phone: [phone]  Fax: [phone]    March 5, 2019        Moira Boykin  [address] [place] [address]          To whom it may concern:    RE: Moira Boykin    Patient was seen and treated today at our clinic and missed work.  She will need to stay home until she has gone 24 hours without fevers.    Please contact me for questions or concerns.      Sincerely,        Lakisha Bran PA-C

## 2019-03-05 NOTE — PATIENT INSTRUCTIONS
Patient Education     Sinusitis (Antibiotic Treatment)    The sinuses are air-filled spaces within the bones of the face. They connect to the inside of the nose. Sinusitis is an inflammation of the tissue that lines the sinuses. Sinusitis can occur during a cold. It can also happen due to allergies to pollens and other particles in the air. Sinusitis can cause symptoms of sinus congestion and a feeling of fullness. A sinus infection causes fever, headache, and facial pain. There is often green or yellow fluid draining from the nose or into the back of the throat (post-nasal drip). You have been given antibiotics to treat this condition.  Home care    Take the full course of antibiotics as instructed. Do not stop taking them, even when you feel better.    Drink plenty of water, hot tea, and other liquids. This may help thin nasal mucus. It also may help your sinuses drain fluids.    Heat may help soothe painful areas of your face. Use a towel soaked in hot water. Or,  the shower and direct the warm spray onto your face. Using a vaporizer along with a menthol rub at night may also help soothe symptoms.     An expectorant with guaifenesin may help thin nasal mucus and help your sinuses drain fluids.    You can use an over-the-counter decongestant, unless a similar medicine was prescribed to you. Nasal sprays work the fastest. Use one that contains phenylephrine or oxymetazoline. First blow your nose gently. Then use the spray. Do not use these medicines more often than directed on the label. If you do, your symptoms may get worse. You may also take pills that contain pseudoephedrine. Don t use products that combine multiple medicines. This is because side effects may be increased. Read labels. You can also ask the pharmacist for help. (People with high blood pressure should not use decongestants. They can raise blood pressure.)    Over-the-counter antihistamines may help if allergies contributed to your  sinusitis.      Do not use nasal rinses or irrigation during an acute sinus infection, unless your healthcare provider tells you to. Rinsing may spread the infection to other areas in your sinuses.    Use acetaminophen or ibuprofen to control pain, unless another pain medicine was prescribed to you. If you have chronic liver or kidney disease or ever had a stomach ulcer, talk with your healthcare provider before using these medicines. (Aspirin should never be taken by anyone under age 18 who is ill with a fever. It may cause severe liver damage.)    Don't smoke. This can make symptoms worse.  Follow-up care  Follow up with your healthcare provider or our staff if you are better in 1 week.  When to seek medical advice  Call your healthcare provider if any of these occur:    Facial pain or headache that gets worse    Stiff neck    Unusual drowsiness or confusion    Swelling of your forehead or eyelids    Vision problems, such as blurred or double vision    Fever of 100.4 F (38 C) or higher, or as directed by your healthcare provider    Seizure    Breathing problems    Symptoms don't go away in 10 days  Prevention  Here are steps you can take to help prevent an infection:    Keep good hand washing habits.    Don t have close contact with people who have sore throats, colds, or other upper respiratory infections.    Don t smoke, and stay away from secondhand smoke.    Stay up to date with of your vaccines.  Date Last Reviewed: 11/1/2017 2000-2018 [employer]. [address] [place] [address], [place] [address]. All rights reserved. This information is not intended as a substitute for professional medical care. Always follow your healthcare professional's instructions.

## 2019-03-05 NOTE — PROGRESS NOTES
"  SUBJECTIVE:   Moira Boykin is a 53 year old female who presents to clinic today for the following health issues:      HPI  Acute Illness   Acute illness concerns: sinus   Onset: 2 weeks     Fever: no     Chills/Sweats: YES- started last night     Headache (location?): YES- a lot pressure     Sinus Pressure:YES- post-nasal drainage, facial pain, teeth pain and mucopurulent discharge    Conjunctivitis:  No     Ear Pain: YES- bilateral off/ on     Rhinorrhea: YES    Congestion: YES    Sore Throat: no      Cough: no    Wheeze: no     Decreased Appetite: YES    Nausea: no    Vomiting: no     Diarrhea:  YES- some     Dysuria/Freq.: no     Fatigue/Achiness: YES    Sick/Strep Exposure: no      Therapies Tried and outcome: advil    Problem list and histories reviewed & adjusted, as indicated.  Additional history: as documented      BP Readings from Last 3 Encounters:   03/05/19 120/80   05/30/18 118/72   01/18/18 110/70    Wt Readings from Last 3 Encounters:   03/05/19 68 kg (150 lb)   05/30/18 65.8 kg (145 lb)   01/18/18 65.9 kg (145 lb 4 oz)           ROS:  Constitutional, HEENT, cardiovascular, pulmonary, gi and gu systems are negative, except as otherwise noted.    OBJECTIVE:     /80   Pulse 75   Temp 98  F (36.7  C) (Oral)   Ht 1.651 m (5' 5\")   Wt 68 kg (150 lb)   LMP  (LMP Unknown)   SpO2 100%   BMI 24.96 kg/m    Body mass index is 24.96 kg/m .  GENERAL: healthy, alert and no distress  HENT: normal cephalic/atraumatic, both ears: clear effusion, rhinorrhea yellow, oropharynx clear, oral mucous membranes moist and sinuses: maxillary tenderness on left  NECK: no adenopathy, no asymmetry, masses, or scars and thyroid normal to palpation  RESP: lungs clear to auscultation - no rales, rhonchi or wheezes  CV: regular rate and rhythm, normal S1 S2, no S3 or S4, no murmur, click or rub, no peripheral edema and peripheral pulses strong  MS: no gross musculoskeletal defects noted, no edema  SKIN: no " suspicious lesions or rashes    Diagnostic Test Results:  Results for orders placed or performed in visit on 03/05/19   Influenza A/B antigen   Result Value Ref Range    Influenza A/B Agn Specimen Nasal     Influenza A Negative NEG^Negative    Influenza B Negative NEG^Negative       ASSESSMENT/PLAN:       ICD-10-CM    1. Acute sinusitis treated with antibiotics in the past 60 days J01.90 cefdinir (OMNICEF) 300 MG capsule     Influenza A/B antigen       See Patient Instructions    Lakisha Bran PA-C  [facility]

## 2019-03-25 DIAGNOSIS — F41.1 GAD (GENERALIZED ANXIETY DISORDER): ICD-10-CM

## 2019-03-25 NOTE — TELEPHONE ENCOUNTER
"Requested Prescriptions   Pending Prescriptions Disp Refills     amitriptyline (ELAVIL) 25 MG tablet 90 tablet 1     Sig: TAKE ONE TAB BY MOUTH ONCE NIGHTLY AT BEDTIME AS NEEDED FOR SLEEP    Tricyclic Agents ( Annual appt and no PHQ9) Passed - 3/25/2019  9:48 AM       Passed - Blood Pressure under 140/90 in past 12 mos    BP Readings from Last 3 Encounters:   03/05/19 120/80   05/30/18 118/72   01/18/18 110/70                Passed - Recent (12 mo) or future (30 days) visit within authorizing provider's specialty    Patient had office visit in the last 12 months or has a visit in the next 30 days with authorizing provider or within the authorizing provider's specialty.  See \"Patient Info\" tab in inbasket, or \"Choose Columns\" in Meds & Orders section of the refill encounter.             Passed - Medication is active on med list       Passed - Patient is age 18 or older       Passed - Patient is not pregnant       Passed - No positive pregnancy test on record in past 12 mos        Last OV 03/05/2019  Last filled 09/11/2018    Two different sigs and noted not taking on 03/05/2019    Flag for provider to review.    Terrence Adams, RN, BSN          "

## 2019-03-26 NOTE — TELEPHONE ENCOUNTER
Saw recently for acute visit- med was not addressed. #30 given, needs OV for this- SF pt. Tiff Rios

## 2019-04-15 ENCOUNTER — TELEPHONE (OUTPATIENT)
Dept: FAMILY MEDICINE | Facility: CLINIC | Age: 54
End: 2019-04-15

## 2019-04-15 NOTE — TELEPHONE ENCOUNTER
Summary:    Patient is due/failing the following:   FIT and MAMMOGRAM  Physical appointment due in May   Action needed:   Complete a FIT test and schedule a mammogram     Type of outreach:    Phone, left message for patient to call back.     Questions for provider review:    None                                                                                                                                    Sarahi Blackwell       Chart routed to Care Team .      Panel Management Review      Patient has the following on her problem list: None      Composite cancer screening  Chart review shows that this patient is due/due soon for the following Mammogram and Fecal Colorectal (FIT)

## 2019-04-23 DIAGNOSIS — F41.1 GAD (GENERALIZED ANXIETY DISORDER): ICD-10-CM

## 2019-04-24 NOTE — TELEPHONE ENCOUNTER
Elavil:  Routing refill request to provider for review/approval because:  Two doses of medication on med list- anish refill given, no appt scheduled.    Breonna Holland, RN, BSN

## 2019-04-26 ENCOUNTER — ANCILLARY PROCEDURE (OUTPATIENT)
Dept: MAMMOGRAPHY | Facility: CLINIC | Age: 54
End: 2019-04-26
Attending: NURSE PRACTITIONER
Payer: COMMERCIAL

## 2019-04-26 DIAGNOSIS — Z12.31 SCREENING MAMMOGRAM, ENCOUNTER FOR: ICD-10-CM

## 2019-04-26 PROCEDURE — 77067 SCR MAMMO BI INCL CAD: CPT

## 2019-05-15 ENCOUNTER — OFFICE VISIT (OUTPATIENT)
Dept: FAMILY MEDICINE | Facility: CLINIC | Age: 54
End: 2019-05-15
Payer: COMMERCIAL

## 2019-05-15 VITALS
TEMPERATURE: 97.9 F | BODY MASS INDEX: 24.96 KG/M2 | SYSTOLIC BLOOD PRESSURE: 140 MMHG | WEIGHT: 150 LBS | OXYGEN SATURATION: 100 % | RESPIRATION RATE: 12 BRPM | DIASTOLIC BLOOD PRESSURE: 80 MMHG | HEART RATE: 69 BPM

## 2019-05-15 DIAGNOSIS — R09.81 CONGESTION OF PARANASAL SINUS: Primary | ICD-10-CM

## 2019-05-15 DIAGNOSIS — R09.89 CHEST CONGESTION: ICD-10-CM

## 2019-05-15 DIAGNOSIS — Z72.0 TOBACCO ABUSE: ICD-10-CM

## 2019-05-15 DIAGNOSIS — F41.1 GAD (GENERALIZED ANXIETY DISORDER): ICD-10-CM

## 2019-05-15 DIAGNOSIS — R07.0 THROAT PAIN: ICD-10-CM

## 2019-05-15 LAB
DEPRECATED S PYO AG THROAT QL EIA: NORMAL
SPECIMEN SOURCE: NORMAL

## 2019-05-15 PROCEDURE — 87880 STREP A ASSAY W/OPTIC: CPT | Performed by: PHYSICIAN ASSISTANT

## 2019-05-15 PROCEDURE — 87081 CULTURE SCREEN ONLY: CPT | Performed by: PHYSICIAN ASSISTANT

## 2019-05-15 PROCEDURE — 93000 ELECTROCARDIOGRAM COMPLETE: CPT | Performed by: PHYSICIAN ASSISTANT

## 2019-05-15 PROCEDURE — 99214 OFFICE O/P EST MOD 30 MIN: CPT | Performed by: PHYSICIAN ASSISTANT

## 2019-05-15 RX ORDER — AZITHROMYCIN 250 MG/1
TABLET, FILM COATED ORAL
Qty: 6 TABLET | Refills: 0 | Status: SHIPPED | OUTPATIENT
Start: 2019-05-15 | End: 2019-06-11

## 2019-05-15 RX ORDER — ALBUTEROL SULFATE 90 UG/1
2 AEROSOL, METERED RESPIRATORY (INHALATION) EVERY 6 HOURS
Qty: 8.5 G | Refills: 0 | Status: SHIPPED | OUTPATIENT
Start: 2019-05-15 | End: 2019-06-11

## 2019-05-15 ASSESSMENT — PAIN SCALES - GENERAL: PAINLEVEL: EXTREME PAIN (8)

## 2019-05-15 NOTE — PATIENT INSTRUCTIONS
Your symptoms are likely still viral   If the sinus pain and congestion worsens - I did send the zithromax to start if needed    Can use the flonase nasal spray     Albuterol 2 puffs every 4-6 hr for the chest congestion.    The EKG was normal. If the chest congestion becomes painful- you should be seen in the ER.    Letter for work     Please plan to see Tiff Rios about the anxiety and for your annual.

## 2019-05-15 NOTE — LETTER
[facility]  [address] [address], [address]  [place] [address]  Phone: [phone]  Fax: [phone]    May 15, 2019        Moira Boykin  [address] [place] [address]          To whom it may concern:    RE: Moira Boykin    Patient was seen and treated today at our clinic and missed work. Please excuse work absence the week of 05/15/2019 due to illness.     Please contact me for questions or concerns.      Sincerely,        Breonna Avila PA-C

## 2019-05-15 NOTE — PROGRESS NOTES
"  SUBJECTIVE:   Moira Boykin is a 53 year old female who presents to clinic today for the following health issues:      History of Present Illness     Depression & Anxiety Follow-up:     Depression/Anxiety:  Depression & Anxiety    Status since last visit::  Stable    Other associated symptoms of depression and anxiety::  None    Significant life event::  No    Current substance use::  None       Today's PHQ-9         PHQ-9 Total Score:         PHQ-9 Q9 Thoughts of better off dead/self-harm past 2 weeks :       Thoughts of suicide or self harm:      Self-harm Plan:        Self-harm Action:          Safety concerns for self or others:            Migraines:     Headache Symptoms are:  Worsening    Migraine frequency::  5 per week    Migraine Duration::  1 hour    Ability to perform ADL's::  Yes    Migraine Rescue/Relief Medications::  Ibuprofen (Advil, Motrin)    Effectiveness of rescue/relief medications::  Intermittent relief    Migraine Preventative Medications::  Amitriptyline    Neurological symptoms::  None    ER or UC Visits::  None    Diet:  Regular (no restrictions)  Frequency of exercise:  2-3 days/week  Duration of exercise:  30-45 minutes  Taking medications regularly:  Yes  Medication side effects:  Lightheadedness    Acute Illness   Acute illness concerns: URI. Patient want strep test done.   Onset: 4 days ago   Sx started with sore throat. Congestion in my head is really bothering me right now. My ears feel so congestion and behind my eyes and \"frontal\". Not blowing much from nose.   \"I feel like I do when I get a sinus infection- this is how I feel.\"   No coughing \"but my chest is congested\". Feeling a heaviness of my chest. Not pleuritic pain. Smokes 3 cigarettes per day. \"Maybe its my anxiety I don't like to be congested. I just don't feel good.\"   Very tearful at her visit today. A lot of stress. 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 Worried " "coworkers are judging. Frustrated. Overwhelmed. Taking care of everyone but myself. My  doesn't care. No SI/HI. I don't normally cry I hold it in. Has been in touch with her therapist via phone. She is taking her amitriptyline. Denies SIHI.   Pt was seen yesterday at outside - told her to keep treating with OTC meds- \"Its not helping\". She did mention the chest pressure- no ekg or cxr.   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     Fever: No    Chills/Sweats: YES- felt chilled.     Headache (location?): YES    Sinus Pressure:YES    Conjunctivitis:  no    Ear Pain: no    Rhinorrhea: no    Congestion: YES    Sore Throat: no, had       Cough: YES-non-productive, heavy in the chest     Wheeze: no     Decreased Appetite: YES     Nausea: no    Vomiting: no    Diarrhea:  No, had it     Dysuria/Freq.: no     Fatigue/Achiness: no     Sick/Strep Exposure: YES-            Therapies Tried and outcome: ibuprofen, advil,     Additional history: as documented    Reviewed and updated as needed this visit by clinical staff  Tobacco  Allergies  Meds  Med Hx  Surg Hx  Fam Hx  Soc Hx        Reviewed and updated as needed this visit by Provider           Patient Active Problem List   Diagnosis     Other genital herpes     Anxiety state     CARDIOVASCULAR SCREENING; LDL GOAL LESS THAN 160     Concussion without loss of consciousness     Papanicolaou smear of cervix with low grade squamous intraepithelial lesion (LGSIL)     Chronic fatigue     BPPV (benign paroxysmal positional vertigo), right     Vertigo     Tobacco abuse     Urticaria     YONI (generalized anxiety disorder)     Past Surgical History:   Procedure Laterality Date     C ANESTH, SECTION      times 2       Social History     Tobacco Use     Smoking status: Light Tobacco Smoker     Packs/day: 0.20     Types: Cigarettes     Smokeless tobacco: Never Used     Tobacco comment: few cigarettes per day  "   Substance Use Topics     Alcohol use: No     Family History   Problem Relation Age of Onset     Hypertension Mother      Gastrointestinal Disease Mother         liver and gallbladder swelling     Alcohol/Drug Mother      Depression Father      Cardiovascular Maternal Grandfather         congestive heart failure     Heart Disease Maternal Grandfather         congestive heart failure     Breast Cancer No family hx of      Anesthesia Reaction No family hx of      Osteoporosis No family hx of      Genetic Disorder No family hx of      Thyroid Disease No family hx of      Obesity No family hx of      Asthma No family hx of      Substance Abuse No family hx of      Colon Cancer No family hx of      Prostate Cancer No family hx of          Current Outpatient Medications   Medication Sig Dispense Refill     amitriptyline (ELAVIL) 10 MG tablet Take 1 tablet (10 mg) by mouth At Bedtime 90 tablet 3     amitriptyline (ELAVIL) 25 MG tablet TAKE ONE TAB BY MOUTH ONCE NIGHTLY AT BEDTIME AS NEEDED FOR SLEEP 30 tablet 0     Ascorbic Acid (VITAMIN C PO)        cholecalciferol (VITAMIN D3) 38421 UNITS capsule Take 1 capsule (50,000 Units) by mouth once a week 8 capsule 0     Cyanocobalamin (VITAMIN B 12 PO)        fluticasone (FLONASE) 50 MCG/ACT nasal spray Reported on 5/8/2017  0     acyclovir (ZOVIRAX) 5 % cream Apply topically 5 times daily (Patient not taking: Reported on 3/5/2019) 5 g 3     ALPRAZolam (XANAX) 0.5 MG tablet Reported on 5/8/2017       DiphenhydrAMINE HCl (BENADRYL PO) Take 25 mg by mouth       Allergies   Allergen Reactions     Sulfa Drugs      Amoxicillin Rash     Augmentin Rash     BP Readings from Last 3 Encounters:   05/15/19 140/80   03/05/19 120/80   05/30/18 118/72    Wt Readings from Last 3 Encounters:   05/15/19 68 kg (150 lb)   03/05/19 68 kg (150 lb)   05/30/18 65.8 kg (145 lb)                  Labs reviewed in EPIC    ROS:  Constitutional, HEENT, cardiovascular, pulmonary, gi and gu systems are  negative, except as otherwise noted.    OBJECTIVE:     /80   Pulse 69   Temp 97.9  F (36.6  C) (Oral)   Resp 12   Wt 68 kg (150 lb)   LMP  (LMP Unknown)   SpO2 100%   BMI 24.96 kg/m    Body mass index is 24.96 kg/m .  GENERAL: healthy, alert and no distress  EYES: Eyes grossly normal to inspection, PERRL and conjunctivae and sclerae normal  HENT: Normal cephalic/atraumatic. Mild sinus tenderness right maxillary and frontal Right ear: canal clear and TM's normal pearly clear arc of fluid.  Left ear: canal clear and TM's normal, no effusion. Bilateral: no pain w/manipulation auricle, tragus, or mastoid percussion. Nose mucosa: normal. Lips normal, no lesions. Buccal muscosa moist. Soft palate no lesions or ulcers. Tonsils normal, no exudates. Uvula midline. Posterior oropharynx no erythema.   NECK: no adenopathy  RESP: lungs clear to auscultation - no rales, rhonchi or wheezes  CV: regular rate and rhythm, normal S1 S2, no S3 or S4, no murmur, click or rub, no peripheral edema and peripheral pulses strong  SKIN: no suspicious lesions or rashes  NEURO: Normal strength and tone, mentation intact and speech normal  PSYCH: mentation appears normal, affect down, tearful about her symptoms; neatly groomed, casually dressed. At end of visit, very tearful. No SI/HI    Diagnostic Test Results:  Results for orders placed or performed in visit on 05/15/19 (from the past 24 hour(s))   Strep, Rapid Screen   Result Value Ref Range    Specimen Description Throat     Rapid Strep A Screen       NEGATIVE: No Group A streptococcal antigen detected by immunoassay, await culture report.     EKG: NSR, no st segment changes.     ASSESSMENT/PLAN:     1. Congestion of paranasal sinus  Viral URI- 4-5d. Very tearful about her sinus pain.   Discussed sinus pain as result of inflammation in mucus membranes and increased mucus production vs from bacterial basis  Discussed symptomatic management. This is her 2nd visit for her sx. Sent  w/zithromax rx, but discussed waiting to start and continued observation for another few days.   Letter given for work.     2. Chest congestion  NSR. Atypical for cardiac. Describes as congestion more than pressure.  Normal lung exam. Pulse ox 100%.   Discussed chest pain s/sx for ER care.  - EKG 12-lead complete w/read - Clinics    3. YONI (generalized anxiety disorder)  Pt having increased anxiety and mood symptoms today  No SI/HI.   Her last visit with her PCP was 1yr ago and she is overdue for follow up  She is only on amitriptyline at bedtime.may need med adjustment which she prefers to see PCP about.   She is seeing her counselor.  Worried about missed work- letter given for illness sx    4. Throat pain  Negative strep.  - Strep, Rapid Screen  - Beta strep group A culture    5. Tobacco abuse  Encouraged cessation.   Trial albuterol inhaler for chest congestion sx with her URI.   - albuterol (PROAIR HFA/PROVENTIL HFA/VENTOLIN HFA) 108 (90 Base) MCG/ACT inhaler; Inhale 2 puffs into the lungs every 6 hours  Dispense: 8.5 g; Refill: 0    Follow Up: The patient was instructed to contact clinic for worsening symptoms, non-improvement as expected/discussed, and for questions regarding medications or treatment plan. Discussed parameters for follow up and included in After Visit Summary given to patient.      Breonna Avila PA-C  [facility]

## 2019-05-16 LAB
BACTERIA SPEC CULT: NORMAL
SPECIMEN SOURCE: NORMAL

## 2019-06-07 NOTE — PROGRESS NOTES
"Subjective     Moira Boykin is a 53 year old female who presents to clinic today for the following health issues:    HPI   {SUPERLIST (Optional):257524}  {additonal problems for provider to add (Optional):576075}    {HIST REVIEW/ LINKS 2 (Optional):354739}    Reviewed and updated as needed this visit by Provider         Review of Systems   {ROS COMP (Optional):365581}      Objective    LMP  (LMP Unknown)   There is no height or weight on file to calculate BMI.  Physical Exam   {Exam List (Optional):387956}    {Diagnostic Test Results (Optional):919449::\"Diagnostic Test Results:\",\"Labs reviewed in Epic\"}        {PROVIDER CHARTING PREFERENCE:187638}    "

## 2019-06-11 ENCOUNTER — OFFICE VISIT (OUTPATIENT)
Dept: FAMILY MEDICINE | Facility: CLINIC | Age: 54
End: 2019-06-11
Payer: COMMERCIAL

## 2019-06-11 VITALS
OXYGEN SATURATION: 100 % | DIASTOLIC BLOOD PRESSURE: 58 MMHG | WEIGHT: 147.2 LBS | SYSTOLIC BLOOD PRESSURE: 106 MMHG | HEART RATE: 68 BPM | HEIGHT: 65 IN | TEMPERATURE: 97.9 F | RESPIRATION RATE: 18 BRPM | BODY MASS INDEX: 24.53 KG/M2

## 2019-06-11 DIAGNOSIS — F41.1 GAD (GENERALIZED ANXIETY DISORDER): ICD-10-CM

## 2019-06-11 DIAGNOSIS — Z72.0 TOBACCO ABUSE: ICD-10-CM

## 2019-06-11 DIAGNOSIS — Z00.01 ENCOUNTER FOR ROUTINE ADULT MEDICAL EXAM WITH ABNORMAL FINDINGS: Primary | ICD-10-CM

## 2019-06-11 PROCEDURE — 99396 PREV VISIT EST AGE 40-64: CPT | Performed by: NURSE PRACTITIONER

## 2019-06-11 ASSESSMENT — PATIENT HEALTH QUESTIONNAIRE - PHQ9
5. POOR APPETITE OR OVEREATING: NOT AT ALL
SUM OF ALL RESPONSES TO PHQ QUESTIONS 1-9: 3

## 2019-06-11 ASSESSMENT — ANXIETY QUESTIONNAIRES
2. NOT BEING ABLE TO STOP OR CONTROL WORRYING: NOT AT ALL
6. BECOMING EASILY ANNOYED OR IRRITABLE: NOT AT ALL
7. FEELING AFRAID AS IF SOMETHING AWFUL MIGHT HAPPEN: NOT AT ALL
3. WORRYING TOO MUCH ABOUT DIFFERENT THINGS: NOT AT ALL
1. FEELING NERVOUS, ANXIOUS, OR ON EDGE: NOT AT ALL
5. BEING SO RESTLESS THAT IT IS HARD TO SIT STILL: NOT AT ALL
GAD7 TOTAL SCORE: 0
IF YOU CHECKED OFF ANY PROBLEMS ON THIS QUESTIONNAIRE, HOW DIFFICULT HAVE THESE PROBLEMS MADE IT FOR YOU TO DO YOUR WORK, TAKE CARE OF THINGS AT HOME, OR GET ALONG WITH OTHER PEOPLE: NOT DIFFICULT AT ALL

## 2019-06-11 ASSESSMENT — MIFFLIN-ST. JEOR: SCORE: 1278.57
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
## 2019-06-12 ASSESSMENT — ANXIETY QUESTIONNAIRES: GAD7 TOTAL SCORE: 0

## 2019-06-14 DIAGNOSIS — Z00.01 ENCOUNTER FOR ROUTINE ADULT MEDICAL EXAM WITH ABNORMAL FINDINGS: ICD-10-CM

## 2019-06-14 LAB
CHOLEST SERPL-MCNC: 235 MG/DL
GLUCOSE SERPL-MCNC: 93 MG/DL (ref 70–99)
HDLC SERPL-MCNC: 70 MG/DL
HGB BLD-MCNC: 14.1 G/DL (ref 11.7–15.7)
LDLC SERPL CALC-MCNC: 146 MG/DL
NONHDLC SERPL-MCNC: 165 MG/DL
TRIGL SERPL-MCNC: 96 MG/DL
TSH SERPL DL<=0.005 MIU/L-ACNC: 0.73 MU/L (ref 0.4–4)

## 2019-06-14 PROCEDURE — 36415 COLL VENOUS BLD VENIPUNCTURE: CPT | Performed by: NURSE PRACTITIONER

## 2019-06-14 PROCEDURE — 84443 ASSAY THYROID STIM HORMONE: CPT | Performed by: NURSE PRACTITIONER

## 2019-06-14 PROCEDURE — 82306 VITAMIN D 25 HYDROXY: CPT | Performed by: NURSE PRACTITIONER

## 2019-06-14 PROCEDURE — 80061 LIPID PANEL: CPT | Performed by: NURSE PRACTITIONER

## 2019-06-14 PROCEDURE — 85018 HEMOGLOBIN: CPT | Performed by: NURSE PRACTITIONER

## 2019-06-14 PROCEDURE — 82947 ASSAY GLUCOSE BLOOD QUANT: CPT | Performed by: NURSE PRACTITIONER

## 2019-06-16 LAB — DEPRECATED CALCIDIOL+CALCIFEROL SERPL-MC: 51 UG/L (ref 20–75)

## 2019-08-17 ENCOUNTER — TRANSFERRED RECORDS (OUTPATIENT)
Dept: HEALTH INFORMATION MANAGEMENT | Facility: CLINIC | Age: 54
End: 2019-08-17

## 2019-08-17 LAB
ALT SERPL-CCNC: 26 IU/L (ref 8–45)
AST SERPL-CCNC: 31 IU/L (ref 2–40)
CREAT SERPL-MCNC: 0.73 MG/DL (ref 0.57–1.11)
GFR SERPL CREATININE-BSD FRML MDRD: >60 ML/MIN/1.73M2
GLUCOSE SERPL-MCNC: 110 MG/DL (ref 65–100)
POTASSIUM SERPL-SCNC: 3.7 MMOL/L (ref 3.5–5)

## 2019-08-20 ENCOUNTER — TRANSFERRED RECORDS (OUTPATIENT)
Dept: HEALTH INFORMATION MANAGEMENT | Facility: CLINIC | Age: 54
End: 2019-08-20

## 2019-08-20 LAB — GLUCOSE SERPL-MCNC: 105 MG/DL (ref 65–100)

## 2019-08-21 LAB
CREAT SERPL-MCNC: 0.74 MG/DL (ref 0.57–1.11)
GFR SERPL CREATININE-BSD FRML MDRD: >60 ML/MIN/1.73M2
POTASSIUM SERPL-SCNC: 4.2 MMOL/L (ref 3.5–5)

## 2019-08-24 ENCOUNTER — TRANSFERRED RECORDS (OUTPATIENT)
Dept: HEALTH INFORMATION MANAGEMENT | Facility: CLINIC | Age: 54
End: 2019-08-24
